# Patient Record
Sex: MALE | Race: WHITE | NOT HISPANIC OR LATINO | Employment: FULL TIME | URBAN - METROPOLITAN AREA
[De-identification: names, ages, dates, MRNs, and addresses within clinical notes are randomized per-mention and may not be internally consistent; named-entity substitution may affect disease eponyms.]

---

## 2023-09-16 VITALS
HEART RATE: 70 BPM | BODY MASS INDEX: 29.43 KG/M2 | WEIGHT: 196.4 LBS | SYSTOLIC BLOOD PRESSURE: 136 MMHG | DIASTOLIC BLOOD PRESSURE: 72 MMHG

## 2023-09-28 ENCOUNTER — HOSPITAL ENCOUNTER (OUTPATIENT)
Dept: DATA CONVERSION | Facility: HOSPITAL | Age: 63
Discharge: HOME | End: 2023-09-28
Payer: COMMERCIAL

## 2023-10-04 DIAGNOSIS — M54.50 ACUTE RIGHT-SIDED LOW BACK PAIN WITHOUT SCIATICA: Primary | ICD-10-CM

## 2023-10-04 RX ORDER — CYCLOBENZAPRINE HCL 10 MG
10 TABLET ORAL 3 TIMES DAILY PRN
COMMUNITY
Start: 2023-08-29 | End: 2023-10-04 | Stop reason: SDUPTHER

## 2023-10-04 RX ORDER — CYCLOBENZAPRINE HCL 10 MG
10 TABLET ORAL 3 TIMES DAILY PRN
Qty: 30 TABLET | Refills: 0 | Status: SHIPPED | OUTPATIENT
Start: 2023-10-04 | End: 2023-10-05 | Stop reason: SDUPTHER

## 2023-10-04 NOTE — TELEPHONE ENCOUNTER
Pt called requesting refill Flexeril.  He advised he can't sleep at night and in the past the muscle relaxer has helped

## 2023-10-06 RX ORDER — CYCLOBENZAPRINE HCL 10 MG
10 TABLET ORAL 3 TIMES DAILY PRN
Qty: 30 TABLET | Refills: 0 | Status: SHIPPED | OUTPATIENT
Start: 2023-10-06 | End: 2023-12-18 | Stop reason: WASHOUT

## 2023-10-07 PROBLEM — Z98.890 OTHER SPECIFIED POSTPROCEDURAL STATES: Status: ACTIVE | Noted: 2023-10-07

## 2023-10-07 PROBLEM — K42.9 REDUCIBLE UMBILICAL HERNIA: Status: ACTIVE | Noted: 2023-10-07

## 2023-10-07 PROBLEM — I10 HIGH BLOOD PRESSURE: Status: ACTIVE | Noted: 2023-10-07

## 2023-10-07 PROBLEM — M54.30 SCIATICA: Status: ACTIVE | Noted: 2023-10-07

## 2023-10-07 PROBLEM — L71.9 ROSACEA: Status: ACTIVE | Noted: 2023-10-07

## 2023-10-07 PROBLEM — H93.19 RINGING IN EARS: Status: ACTIVE | Noted: 2023-10-07

## 2023-10-07 RX ORDER — AMLODIPINE BESYLATE 5 MG/1
5 TABLET ORAL DAILY
COMMUNITY
End: 2023-11-17

## 2023-10-07 RX ORDER — ZINC SULFATE 50(220)MG
1 CAPSULE ORAL DAILY
COMMUNITY
Start: 2021-09-07

## 2023-10-07 RX ORDER — LISINOPRIL AND HYDROCHLOROTHIAZIDE 20; 25 MG/1; MG/1
1 TABLET ORAL DAILY
COMMUNITY
End: 2023-11-17

## 2023-10-10 ENCOUNTER — TREATMENT (OUTPATIENT)
Dept: PHYSICAL THERAPY | Facility: CLINIC | Age: 63
End: 2023-10-10
Payer: COMMERCIAL

## 2023-10-10 DIAGNOSIS — M54.9 DORSALGIA: ICD-10-CM

## 2023-10-10 DIAGNOSIS — M54.9 DORSALGIA: Primary | ICD-10-CM

## 2023-10-10 PROCEDURE — 97140 MANUAL THERAPY 1/> REGIONS: CPT | Mod: GP,CQ

## 2023-10-10 PROCEDURE — 97110 THERAPEUTIC EXERCISES: CPT | Mod: GP,CQ

## 2023-10-10 PROCEDURE — 97014 ELECTRIC STIMULATION THERAPY: CPT | Mod: GP,CQ

## 2023-10-10 ASSESSMENT — PAIN - FUNCTIONAL ASSESSMENT: PAIN_FUNCTIONAL_ASSESSMENT: 0-10

## 2023-10-10 NOTE — PROGRESS NOTES
Physical Therapy    Physical Therapy Treatment    Patient Name: Alexandre Poole  MRN: [id number]  Today's Date: 10/10/2023  Time Calculation  Start Time: 0900  Stop Time: 0945  Time Calculation (min): 45 min      Assessment:  PT Assessment  PT Assessment Results:  (Pt able to lie supine for thera ex without difficulty, however was proped with pillowsw, able to jerrell supine IFC with H.P. as well)  Assessment Comment: cont with strengthening act as jerrell    Plan:       Current Problem  1. Dorsalgia  Follow Up In Physical Therapy          Subjective   General  Reason for Referral: LBP/SPASMS  General Comment: Pt reports discomfort has decreased in intensity ,seems to have settled in B hip areas  Precautions  Precautions  Precautions Comment: none       Pain  Pain Assessment: 0-10  Pain Score:  (varries with act still unable to sleep in bed at this time)  Pain Type: Chronic pain  Pain Location: Back  Pain Radiating Towards: B hips    Objective                Treatments:  Therapeutic Exercise  Therapeutic Exercise Performed: Yes  Therapeutic Exercise Activity 1: Sitting: HS stretch  Therapeutic Exercise Activity 2: Standing: add-abd,  hip ext ,knee flex, slant board, mini squats, toe raises, knee flex# 20 wt 1 x 10/ kne ext # 10 wt 1 x 10  Therapeutic Exercise Activity 3: Supine : add-abd, ball squeeze add, GTB abd, heel slides, SLR, (issued GTB)    Manual Therapy  Manual Therapy Performed: Yes  Manual Therapy Activity 1: STM sitting B LS area    Modalities  Modalities Used: Yes  Modality 1:  (IFC with H.P. supine)        OP EDUCATION:Cont with HEP stretching       Goals:  reduce pain to <3/10 for improved tolerance of motion and functional activities  +  Increase lumbar ROM by 10 degrees in all planes of motion as needed for ADL's   +  The pt will have 5/5 strength in  LE as needed for  + educate on core strength in order to support the lumbar spine in function  +  the pt will be independent in self posture correction to  place reduced stress on the lumbar spine  + Reduce soft tissue restriction for reduced pain  +   The pt will walk with  in the community with good balance and stability  +  the pt will be able independent in a HEP for self managment  +   Able to return to prior level of activities

## 2023-10-12 ENCOUNTER — TREATMENT (OUTPATIENT)
Dept: PHYSICAL THERAPY | Facility: CLINIC | Age: 63
End: 2023-10-12
Payer: COMMERCIAL

## 2023-10-12 DIAGNOSIS — M62.830 BACK SPASM: Primary | ICD-10-CM

## 2023-10-12 DIAGNOSIS — M54.9 DORSALGIA: ICD-10-CM

## 2023-10-12 DIAGNOSIS — M54.30 SCIATICA, UNSPECIFIED LATERALITY: ICD-10-CM

## 2023-10-12 PROCEDURE — 97014 ELECTRIC STIMULATION THERAPY: CPT | Mod: GP | Performed by: PHYSICAL THERAPIST

## 2023-10-12 PROCEDURE — 97140 MANUAL THERAPY 1/> REGIONS: CPT | Mod: GP | Performed by: PHYSICAL THERAPIST

## 2023-10-12 PROCEDURE — 97110 THERAPEUTIC EXERCISES: CPT | Mod: GP | Performed by: PHYSICAL THERAPIST

## 2023-10-12 ASSESSMENT — PAIN - FUNCTIONAL ASSESSMENT: PAIN_FUNCTIONAL_ASSESSMENT: 0-10

## 2023-10-12 ASSESSMENT — PAIN SCALES - GENERAL: PAINLEVEL_OUTOF10: 1

## 2023-10-12 NOTE — PROGRESS NOTES
Physical Therapy Treatment    Patient Name: Alexandre Poole  MRN: [id number]  Today's Date: 10/12/2023  Time Calculation  Start Time: 0845  Stop Time: 0945  Time Calculation (min): 60 min    Visit Number:  9    Current Problem  1. Back spasm        2. Dorsalgia  Follow Up In Physical Therapy      3. Sciatica, unspecified laterality            Precautions  Precautions  Precautions Comment: none    Pain  Pain Assessment: 0-10  Pain Score: 1    Subjective/General  Reason for Referral: LBP/SPASMS  General Comment: Pain starts at about 6-7 pm.  He plans to go back to the doctor to get an x-ray to know what is going on in his back (4-5/10)      Objective    Treatments:  Recumbent biking : 7 minutes at level 1    Slant board 3 x30 seconds  Hamstring stretch 3 x30 seconds  Seated piriformis stretch  1x30 seconds  Seat buttock stretch (knee hug)    3x30 seconds    Supine:  LTR, core stabilization x5 with breathing,  attempted hip flexor stretch    Manual Therapy  Manual Therapy Performed: Yes  Manual Therapy Activity 1: STM sitting B LS area     Modalities  Modalities Used: Yes  Modality 1:  Interferential electric stimulation (IFC) performed to lumbar R>L   at 0-10 Hz with sweep for 15 minutes to strong sensation with moist heat in L side lying        OP EDUCATION:  Cont with HEP stretching     Goals:    +reduce pain to <3/10 for improved tolerance of motion and functional activities  +  Increase lumbar ROM by 10 degrees in all planes of motion as needed for ADL's   +  The pt will have 5/5 strength in  LE as needed for  + educate on core strength in order to support the lumbar spine in function  +  the pt will be independent in self posture correction to place reduced stress on the lumbar spine  + Reduce soft tissue restriction for reduced pain  +   The pt will walk with  in the community with good balance and stability  +  the pt will be able independent in a HEP for self managment  +   Able to return to prior level of  activities     Assessment:  PT Assessment  Assessment Comment: The R LE ER on the bike to compensate for lack of flexibility in the R hip    End of session pain ratin/10 after sitting up after stim, but always calms down quickly    Plan:     Reassess next visit    Assessment of current progress against goals:  Progressing toward functional goals

## 2023-10-16 ENCOUNTER — TREATMENT (OUTPATIENT)
Dept: PHYSICAL THERAPY | Facility: CLINIC | Age: 63
End: 2023-10-16
Payer: COMMERCIAL

## 2023-10-16 DIAGNOSIS — M54.30 SCIATICA, UNSPECIFIED LATERALITY: ICD-10-CM

## 2023-10-16 DIAGNOSIS — M54.9 DORSALGIA: ICD-10-CM

## 2023-10-16 DIAGNOSIS — M62.830 BACK SPASM: Primary | ICD-10-CM

## 2023-10-16 PROCEDURE — 97140 MANUAL THERAPY 1/> REGIONS: CPT | Mod: GP | Performed by: PHYSICAL THERAPIST

## 2023-10-16 PROCEDURE — 97110 THERAPEUTIC EXERCISES: CPT | Mod: GP | Performed by: PHYSICAL THERAPIST

## 2023-10-16 ASSESSMENT — PAIN - FUNCTIONAL ASSESSMENT: PAIN_FUNCTIONAL_ASSESSMENT: 0-10

## 2023-10-16 NOTE — PROGRESS NOTES
Physical Therapy Re-assessment and Treatment    Patient Name: Alexandre Poole  MRN: [id number]  Today's Date: 10/16/2023  Time Calculation  Start Time: 0745  Stop Time: 0835  Time Calculation (min): 50 min    Visit Number:  10   Current Problem  1. Back spasm        2. Dorsalgia  Follow Up In Physical Therapy      3. Sciatica, unspecified laterality            Precautions  Precautions  Precautions Comment: none    Pain  Pain Assessment: 0-10  Pain Score:  (3-4/10)  Pain Location: Hip  Pain Orientation: Right    Pain ranges 0/10 -4/10    Subjective/General  Reason for Referral: LBP/SPASMS  General Comment: the pt had a god weekend and had no pain on Friday and Saturday but yesterday and today pain has gone into his hip.  He is able to take the trash out and go up and down steps without pain    He  is sleeping in the zero gravity chair    [facility] 28% impaired (from 54% )    Objective    LUMBAR ROM:   Flexion 48  Extension 26 with pulling  Side bend   R:      25     L  31   Rotation      R  75%       L   100%        MMT:                          R                      L     Hip flexion                  5/5               5 /5    SPECIAL TESTS    R                   L  JASON                 ( - )                 ( - )  FADIR                  ( - )                 ( - )  Cr              ( + )                 ( + )  SLR                       ( - )                 ( - )    FLEXIBILTY:                        R                L  Piriformis                          tight               tight  SKTC                                    ( -)                ( -)  Hamstrings ( at 90/90)      -25                -20     Treatment:  Therapeutic Exercise  Therapeutic Exercise Performed: Yes  Recumbent biking : 8 minutes at level 2    Slant board 3 x30 seconds  Hamstring stretch 3 x30 seconds  Standing hip flexor stretch 3 x30 seconds    Manual Therapy  Manual Therapy Performed: Yes  STM to r lumbar and mid to low thoracic  TPR to R  lumbosacral area    OP EDUCATION:   Discussed current status, goals, and treatment plan.  educated on self trigger point release techniques.    Assessment:   The Pt is making good progress with reduced impairment per Oswestry and improvements in ROM, strength, pain, and flexibility.  He has remaining deficits, but pain is less overall with days of no pain.  He has not returned to prior level of function yet with not sleeping in the bed yet, but can tolerated short period laying supine    End of session pain ratin/10    Plan:     Continue with current POC with the following changes focus on stretches with focus on back and hips    Goals:  Active       PT Problem       +  reduce pain to <3/10 for improved tolerance of motion and functional activities  (Progressing)       Start:  23    Expected End:  23            +  Increase lumbar ROM by 10 degrees in all planes of motion as needed for ADL's   (Progressing)       Start:  23    Expected End:  23            +  The pt will have 5/5 strength in  LE as needed for  (Met)       Start:  23    Expected End:  23    Resolved:  10/16/23         + educate on core strength in order to support the lumbar spine in function  (Progressing)       Start:  23    Expected End:  23            +  the pt will be independent in self posture correction to place reduced stress on the lumbar spine  (Progressing)       Start:  23    Expected End:  23            + Reduce soft tissue restriction for reduced pain  (Progressing)       Start:  23    Expected End:  23            +   The pt will walk with  in the community with good balance and stability  (Progressing)       Start:  23    Expected End:  23            +  the pt will be able independent in a HEP for self managment  (Progressing)       Start:  23    Expected End:  23            +   Able to return to prior level of activities (Progressing)        Start:  09/22/23    Expected End:  12/21/23

## 2023-10-19 ENCOUNTER — TREATMENT (OUTPATIENT)
Dept: PHYSICAL THERAPY | Facility: CLINIC | Age: 63
End: 2023-10-19
Payer: COMMERCIAL

## 2023-10-19 DIAGNOSIS — M54.9 DORSALGIA: ICD-10-CM

## 2023-10-19 DIAGNOSIS — M62.830 BACK SPASM: Primary | ICD-10-CM

## 2023-10-19 PROCEDURE — 97110 THERAPEUTIC EXERCISES: CPT | Mod: GP | Performed by: PHYSICAL THERAPIST

## 2023-10-19 PROCEDURE — 97140 MANUAL THERAPY 1/> REGIONS: CPT | Mod: GP | Performed by: PHYSICAL THERAPIST

## 2023-10-19 NOTE — PROGRESS NOTES
Physical Therapy Re-assessment and Treatment    Patient Name: Alexandre Poole  MRN: [id number]  Today's Date: 10/19/2023  Time Calculation  Start Time: 743  Stop Time: 829  Time Calculation (min): 46 min    Visit Number:  11  Current Problem  1. Back spasm        2. Dorsalgia  Follow Up In Physical Therapy          Precautions  none    Pain  0/10    Subjective/General  No problem this morning.  He slept without any pain.  He occasionally needs ibuprofen.      Objective  Treatment:  Recumbent biking : 8 minutes at level 1    Slant board 3 x30 seconds  Hamstring stretch 3 x30 seconds  Supine R hip flexor stretch 3 x30 seconds  Seated buttock stretch 3 x 30 seconds each    Supine:  LTR, core stabilization x5 with breathing,       Manual Therapy  Manual Therapy Performed: Yes  Manual Therapy Activity 1: STM sitting B LS area  STM to r lumbar and mid to low thoracic  TPR to R lumbosacral area     OP EDUCATION:   Discussed use of home massage unit     Assessment:   The Pt is making good progress with ability to perform hip flexor stretch and core stabilization in supine with pressure on the low back, but not increased pain.  Tightness remains to the r of the lumbar spine with hard trigger points.      End of session pain ratin-2/10 - Pt says this usually calms down as he walks to the car    Plan:   Progress as tolerated with increases streching, manual and core.  Consider US to trigger points    Goals:  Active       PT Problem       +  reduce pain to <3/10 for improved tolerance of motion and functional activities  (Progressing)       Start:  23    Expected End:  23            +  Increase lumbar ROM by 10 degrees in all planes of motion as needed for ADL's   (Progressing)       Start:  23    Expected End:  23            +  The pt will have 5/5 strength in  LE as needed for  (Met)       Start:  23    Expected End:  23    Resolved:  10/16/23         + educate on core strength in  order to support the lumbar spine in function  (Progressing)       Start:  09/22/23    Expected End:  12/21/23            +  the pt will be independent in self posture correction to place reduced stress on the lumbar spine  (Progressing)       Start:  09/22/23    Expected End:  12/21/23            + Reduce soft tissue restriction for reduced pain  (Progressing)       Start:  09/22/23    Expected End:  12/21/23            +   The pt will walk with  in the community with good balance and stability  (Progressing)       Start:  09/22/23    Expected End:  12/21/23            +  the pt will be able independent in a HEP for self managment  (Progressing)       Start:  09/22/23    Expected End:  12/21/23            +   Able to return to prior level of activities (Progressing)       Start:  09/22/23    Expected End:  12/21/23

## 2023-10-24 ENCOUNTER — TREATMENT (OUTPATIENT)
Dept: PHYSICAL THERAPY | Facility: CLINIC | Age: 63
End: 2023-10-24
Payer: COMMERCIAL

## 2023-10-24 PROCEDURE — 97110 THERAPEUTIC EXERCISES: CPT | Mod: GP,CQ

## 2023-10-24 PROCEDURE — 97124 MASSAGE THERAPY: CPT | Mod: GP,CQ

## 2023-10-24 ASSESSMENT — PAIN - FUNCTIONAL ASSESSMENT: PAIN_FUNCTIONAL_ASSESSMENT: 0-10

## 2023-10-24 ASSESSMENT — PAIN SCALES - GENERAL: PAINLEVEL_OUTOF10: 3

## 2023-10-24 NOTE — PROGRESS NOTES
Physical Therapy    Physical Therapy Treatment    Patient Name: Alexandre Poole  MRN: [id number]  Today's Date: 10/24/2023         Assessment:  PT Assessment  Assessment Comment: Cont with stretching and strengthening as jerrell    Plan:   Progress as tolerated    Current Problem  No diagnosis found.    Subjective   General  Reason for Referral: LBP/SPASMS  General Comment: pain has gone into his hip (Discomfort decreasing mostly feels fine)  Precautions none  Precautions  Precautions Comment: none         Pain Assessment: 0-10  Pain Score: 3  Pain Location: Hip  Pain Orientation: Right    Objective     Treatments:  Therapeutic Exercise  Therapeutic Exercise Performed: Yes  Therapeutic Exercise Activity 1: Bike x 8 min, slant board stretch, H.S. stretch, Supine: SKTC/H.S. stretch and piriformis stretch, PPT, LTR  Therapeutic Exercise Activity 2: Supine ball squeeze add, OTB abd 10 x 1 each, Prone quad stretch with green strap (Supine hip flexor stretch over EOB)  Therapeutic Exercise Activity 3: Standing add-abd, hip ext, knee flex toe raises, mini squats 10 x 1 each    Manual Therapy  Manual Therapy Performed: Yes  Manual Therapy Activity 1: STM in L sidelying to R LS area    OP EDUCATION:Discussed obtaining tennis ball ,sitting on ball as needed piriformis area, cont with HEP as issued previously       Goals:  Active       PT Problem       +  reduce pain to <3/10 for improved tolerance of motion and functional activities  (Progressing)       Start:  09/22/23    Expected End:  12/21/23            +  Increase lumbar ROM by 10 degrees in all planes of motion as needed for ADL's   (Progressing)       Start:  09/22/23    Expected End:  12/21/23            +  The pt will have 5/5 strength in  LE as needed for  (Met)       Start:  09/22/23    Expected End:  12/21/23    Resolved:  10/16/23         + educate on core strength in order to support the lumbar spine in function  (Progressing)       Start:  09/22/23    Expected End:   12/21/23            +  the pt will be independent in self posture correction to place reduced stress on the lumbar spine  (Progressing)       Start:  09/22/23    Expected End:  12/21/23            + Reduce soft tissue restriction for reduced pain  (Progressing)       Start:  09/22/23    Expected End:  12/21/23            +   The pt will walk with  in the community with good balance and stability  (Progressing)       Start:  09/22/23    Expected End:  12/21/23            +  the pt will be able independent in a HEP for self managment  (Progressing)       Start:  09/22/23    Expected End:  12/21/23            +   Able to return to prior level of activities (Progressing)       Start:  09/22/23    Expected End:  12/21/23

## 2023-10-26 ENCOUNTER — TREATMENT (OUTPATIENT)
Dept: PHYSICAL THERAPY | Facility: CLINIC | Age: 63
End: 2023-10-26
Payer: COMMERCIAL

## 2023-10-26 DIAGNOSIS — M62.830 BACK SPASM: Primary | ICD-10-CM

## 2023-10-26 PROCEDURE — 97140 MANUAL THERAPY 1/> REGIONS: CPT | Mod: GP | Performed by: PHYSICAL THERAPIST

## 2023-10-26 PROCEDURE — 97110 THERAPEUTIC EXERCISES: CPT | Mod: GP | Performed by: PHYSICAL THERAPIST

## 2023-10-26 ASSESSMENT — PAIN SCALES - GENERAL: PAINLEVEL_OUTOF10: 1

## 2023-10-26 ASSESSMENT — PAIN - FUNCTIONAL ASSESSMENT: PAIN_FUNCTIONAL_ASSESSMENT: 0-10

## 2023-10-30 ENCOUNTER — TREATMENT (OUTPATIENT)
Dept: PHYSICAL THERAPY | Facility: CLINIC | Age: 63
End: 2023-10-30
Payer: COMMERCIAL

## 2023-10-30 PROCEDURE — 97140 MANUAL THERAPY 1/> REGIONS: CPT | Mod: GP,CQ

## 2023-10-30 PROCEDURE — 97110 THERAPEUTIC EXERCISES: CPT | Mod: GP,CQ

## 2023-10-30 ASSESSMENT — PAIN SCALES - GENERAL: PAINLEVEL_OUTOF10: 1

## 2023-10-30 ASSESSMENT — PAIN - FUNCTIONAL ASSESSMENT: PAIN_FUNCTIONAL_ASSESSMENT: 0-10

## 2023-10-30 NOTE — PROGRESS NOTES
Physical Therapy    Physical Therapy Treatment    Patient Name: Alexandre Poole  MRN: [id number]  Today's Date: 10/30/2023         Assessment:    Performed all act well without discomfort and added weights this session with standing act    Plan:   Progress as tolerated    Current Problem  No diagnosis found.    Subjective   General  Reason for Referral: LBP/SPASMS  General Comment: still uncomfortable at night  after about 4 hours, maybe bed, discomfortis slowly disappearing  Precautions  Precautions  Precautions Comment: none  Vital Signs     Pain  Pain Assessment: 0-10  Pain Score: 1  Pain Location: Back  Pain Orientation: Right    Objective     Therapeutic Exercise  Therapeutic Exercise Performed: Yes  Therapeutic Exercise Activity 1: Bike , sitting HS stretch, slant board stretch (Supine: PPT , LTR,SKTC/HS stretch)  Therapeutic Exercise Activity 2: Prone green strap quad stretch, prone toe prop knee ext  Therapeutic Exercise Activity 3: Standing: add-abd , hip ext, mini squats, toe raises, knee flex added # 2 wt 10 x 1 each  Added supine: SKTC/HS stretch/piriformis stretch      Goals:  Active       PT Problem       +  reduce pain to <3/10 for improved tolerance of motion and functional activities  (Progressing)       Start:  09/22/23    Expected End:  12/21/23            +  Increase lumbar ROM by 10 degrees in all planes of motion as needed for ADL's   (Progressing)       Start:  09/22/23    Expected End:  12/21/23            +  The pt will have 5/5 strength in  LE as needed for  (Met)       Start:  09/22/23    Expected End:  12/21/23    Resolved:  10/16/23         + educate on core strength in order to support the lumbar spine in function  (Progressing)       Start:  09/22/23    Expected End:  12/21/23            +  the pt will be independent in self posture correction to place reduced stress on the lumbar spine  (Progressing)       Start:  09/22/23    Expected End:  12/21/23            + Reduce soft tissue  restriction for reduced pain  (Progressing)       Start:  09/22/23    Expected End:  12/21/23            +   The pt will walk with  in the community with good balance and stability  (Progressing)       Start:  09/22/23    Expected End:  12/21/23            +  the pt will be able independent in a HEP for self managment  (Progressing)       Start:  09/22/23    Expected End:  12/21/23            +   Able to return to prior level of activities (Progressing)       Start:  09/22/23    Expected End:  12/21/23

## 2023-11-02 ENCOUNTER — TREATMENT (OUTPATIENT)
Dept: PHYSICAL THERAPY | Facility: CLINIC | Age: 63
End: 2023-11-02
Payer: COMMERCIAL

## 2023-11-02 DIAGNOSIS — M62.830 BACK SPASM: Primary | ICD-10-CM

## 2023-11-02 PROCEDURE — 97140 MANUAL THERAPY 1/> REGIONS: CPT | Mod: GP | Performed by: PHYSICAL THERAPIST

## 2023-11-02 PROCEDURE — 97110 THERAPEUTIC EXERCISES: CPT | Mod: GP | Performed by: PHYSICAL THERAPIST

## 2023-11-02 ASSESSMENT — PAIN - FUNCTIONAL ASSESSMENT: PAIN_FUNCTIONAL_ASSESSMENT: 0-10

## 2023-11-02 ASSESSMENT — PAIN SCALES - GENERAL: PAINLEVEL_OUTOF10: 1

## 2023-11-02 NOTE — PROGRESS NOTES
Physical Therapy Treatment     Patient Name: Alexandre Poole  MRN: [id number]  Today's Date: 2023  Time Calculation  Start Time: 0750  Stop Time: 0845  Time Calculation (min): 55 min    Visit Number:  15     Current Problem  No diagnosis found.    Precautions  Precautions  Precautions Comment: none    Pain  Pain Assessment: 0-10  Pain Score: 1  Pain Location: Back  Pain Orientation: Right    Subjective/General  Reason for Referral: LBP/SPASMS  The pt is uncomfortable to sleep in the recliner, but can only sleep in the bed for about 4 hours before pain.    Objective  Treatment:    Therapeutic Exercise:  40 minutes   Bike  10 minutes    sitting   +HS stretch 3x30 seconds  +slant board stretch 3x30 seconds  +Piriformis stretch 3x30 seconds  (DNP -Supine: PPT , LTR,SKTC/HS stretch)    Prone green strap quad stretch, prone toe prop knee ext    Sidelying:  Passive hip flexor stretch R    Manual Therapy:  15 minutes  STM and TPR to R lumbosacral region.     OP EDUCATION:     Continue stretching to maintain motion    Assessment:   Improved motion with stretched equal L to R, but trigger points remain and are hard in areas.      End of session pain ratin/10    Plan:  Progress as tolerated    Assessment of current progress against goals:  Symptomatic relief with treatment  Goals:  Active       PT Problem       +  reduce pain to <3/10 for improved tolerance of motion and functional activities  (Progressing)       Start:  23    Expected End:  23            +  Increase lumbar ROM by 10 degrees in all planes of motion as needed for ADL's   (Progressing)       Start:  23    Expected End:  23            +  The pt will have 5/5 strength in  LE as needed for  (Met)       Start:  23    Expected End:  23    Resolved:  10/16/23         + educate on core strength in order to support the lumbar spine in function  (Progressing)       Start:  23    Expected End:  23            +  the  pt will be independent in self posture correction to place reduced stress on the lumbar spine  (Progressing)       Start:  09/22/23    Expected End:  12/21/23            + Reduce soft tissue restriction for reduced pain  (Progressing)       Start:  09/22/23    Expected End:  12/21/23            +   The pt will walk with  in the community with good balance and stability  (Progressing)       Start:  09/22/23    Expected End:  12/21/23            +  the pt will be able independent in a HEP for self managment  (Progressing)       Start:  09/22/23    Expected End:  12/21/23            +   Able to return to prior level of activities (Progressing)       Start:  09/22/23    Expected End:  12/21/23

## 2023-11-06 ENCOUNTER — TREATMENT (OUTPATIENT)
Dept: PHYSICAL THERAPY | Facility: CLINIC | Age: 63
End: 2023-11-06
Payer: COMMERCIAL

## 2023-11-06 PROCEDURE — 97110 THERAPEUTIC EXERCISES: CPT | Mod: GP,CQ

## 2023-11-06 PROCEDURE — 97140 MANUAL THERAPY 1/> REGIONS: CPT | Mod: GP,CQ

## 2023-11-06 ASSESSMENT — PAIN SCALES - GENERAL: PAINLEVEL_OUTOF10: 0 - NO PAIN

## 2023-11-06 ASSESSMENT — PAIN - FUNCTIONAL ASSESSMENT: PAIN_FUNCTIONAL_ASSESSMENT: 0-10

## 2023-11-06 NOTE — PROGRESS NOTES
Physical Therapy    Physical Therapy Treatment    Patient Name: Alexandre Poole  MRN: [id number]  Today's Date: 11/6/2023         Assessment:Progressing well towards estab. goals  PT Assessment  PT Assessment Results:  (Progressing well towards estab. goals)    Plan:Cont with HEP       Current Problem  No diagnosis found.    Subjective Now able to sleep at night in bed without waking up  General  Reason for Referral: LBP/SPASMS  General Comment: now able to sleep comfortable in bed all night without waking up  Pain  Pain Assessment: 0-10  Pain Score: 0 - No pain  Pain Location: Back  Pain Orientation: Right    Objective          Treatments:  Therapeutic Exercise  Therapeutic Exercise Performed: Yes  Therapeutic Exercise Activity 1: Supine SKTC-HS - piriformis stretch, LTR in hooklying, ball squeeze add, GTB abd, bridging  Therapeutic Exercise Activity 2: Prone green strap knee flex, toe prop knee ext  Therapeutic Exercise Activity 3: Standing: add-abd, hip ext knee flex with # 2 wt added, mini squats toe raises 10 x 1 each    Manual Therapy  Manual Therapy Performed: Yes  Manual Therapy Activity 1: STM  to RLS area        OP EDUCATION:cont withy HEP       Goals:  Active       PT Problem       +  reduce pain to <3/10 for improved tolerance of motion and functional activities  (Progressing)       Start:  09/22/23    Expected End:  12/21/23            +  Increase lumbar ROM by 10 degrees in all planes of motion as needed for ADL's   (Progressing)       Start:  09/22/23    Expected End:  12/21/23            +  The pt will have 5/5 strength in  LE as needed for  (Met)       Start:  09/22/23    Expected End:  12/21/23    Resolved:  10/16/23         + educate on core strength in order to support the lumbar spine in function  (Progressing)       Start:  09/22/23    Expected End:  12/21/23            +  the pt will be independent in self posture correction to place reduced stress on the lumbar spine  (Progressing)        Start:  09/22/23    Expected End:  12/21/23            + Reduce soft tissue restriction for reduced pain  (Progressing)       Start:  09/22/23    Expected End:  12/21/23            +   The pt will walk with  in the community with good balance and stability  (Progressing)       Start:  09/22/23    Expected End:  12/21/23            +  the pt will be able independent in a HEP for self managment  (Progressing)       Start:  09/22/23    Expected End:  12/21/23            +   Able to return to prior level of activities (Progressing)       Start:  09/22/23    Expected End:  12/21/23

## 2023-11-07 DIAGNOSIS — M54.30 SCIATICA, UNSPECIFIED LATERALITY: ICD-10-CM

## 2023-11-07 DIAGNOSIS — M62.830 BACK SPASM: Primary | ICD-10-CM

## 2023-11-09 ENCOUNTER — APPOINTMENT (OUTPATIENT)
Dept: PHYSICAL THERAPY | Facility: CLINIC | Age: 63
End: 2023-11-09
Payer: COMMERCIAL

## 2023-11-17 DIAGNOSIS — I10 ESSENTIAL (PRIMARY) HYPERTENSION: ICD-10-CM

## 2023-11-17 RX ORDER — LISINOPRIL AND HYDROCHLOROTHIAZIDE 20; 25 MG/1; MG/1
1 TABLET ORAL DAILY
Qty: 90 TABLET | Refills: 1 | Status: SHIPPED | OUTPATIENT
Start: 2023-11-17 | End: 2024-05-15

## 2023-11-17 RX ORDER — AMLODIPINE BESYLATE 5 MG/1
5 TABLET ORAL DAILY
Qty: 90 TABLET | Refills: 1 | Status: SHIPPED | OUTPATIENT
Start: 2023-11-17 | End: 2024-05-15

## 2023-11-22 ENCOUNTER — APPOINTMENT (OUTPATIENT)
Dept: PRIMARY CARE | Facility: CLINIC | Age: 63
End: 2023-11-22
Payer: COMMERCIAL

## 2023-12-13 ENCOUNTER — TELEPHONE (OUTPATIENT)
Dept: PRIMARY CARE | Facility: CLINIC | Age: 63
End: 2023-12-13
Payer: COMMERCIAL

## 2023-12-13 DIAGNOSIS — Z12.5 PROSTATE CANCER SCREENING: ICD-10-CM

## 2023-12-13 DIAGNOSIS — I10 PRIMARY HYPERTENSION: Primary | ICD-10-CM

## 2023-12-18 ENCOUNTER — OFFICE VISIT (OUTPATIENT)
Dept: PRIMARY CARE | Facility: CLINIC | Age: 63
End: 2023-12-18
Payer: COMMERCIAL

## 2023-12-18 VITALS
SYSTOLIC BLOOD PRESSURE: 132 MMHG | WEIGHT: 201 LBS | DIASTOLIC BLOOD PRESSURE: 78 MMHG | HEIGHT: 68 IN | HEART RATE: 74 BPM | OXYGEN SATURATION: 98 % | BODY MASS INDEX: 30.46 KG/M2

## 2023-12-18 DIAGNOSIS — I10 PRIMARY HYPERTENSION: Primary | ICD-10-CM

## 2023-12-18 DIAGNOSIS — Z12.5 PROSTATE CANCER SCREENING: ICD-10-CM

## 2023-12-18 DIAGNOSIS — R11.0 NAUSEA: ICD-10-CM

## 2023-12-18 PROBLEM — Z98.890 OTHER SPECIFIED POSTPROCEDURAL STATES: Status: RESOLVED | Noted: 2023-10-07 | Resolved: 2023-12-18

## 2023-12-18 PROCEDURE — 3078F DIAST BP <80 MM HG: CPT | Performed by: FAMILY MEDICINE

## 2023-12-18 PROCEDURE — 3075F SYST BP GE 130 - 139MM HG: CPT | Performed by: FAMILY MEDICINE

## 2023-12-18 PROCEDURE — 99214 OFFICE O/P EST MOD 30 MIN: CPT | Performed by: FAMILY MEDICINE

## 2023-12-18 PROCEDURE — 1036F TOBACCO NON-USER: CPT | Performed by: FAMILY MEDICINE

## 2023-12-18 ASSESSMENT — PATIENT HEALTH QUESTIONNAIRE - PHQ9
SUM OF ALL RESPONSES TO PHQ9 QUESTIONS 1 AND 2: 0
2. FEELING DOWN, DEPRESSED OR HOPELESS: NOT AT ALL
1. LITTLE INTEREST OR PLEASURE IN DOING THINGS: NOT AT ALL

## 2023-12-18 ASSESSMENT — PAIN SCALES - GENERAL: PAINLEVEL: 1

## 2023-12-18 ASSESSMENT — ENCOUNTER SYMPTOMS
LOSS OF SENSATION IN FEET: 0
OCCASIONAL FEELINGS OF UNSTEADINESS: 0
DEPRESSION: 0

## 2023-12-18 ASSESSMENT — LIFESTYLE VARIABLES
HOW OFTEN DO YOU HAVE A DRINK CONTAINING ALCOHOL: MONTHLY OR LESS
SKIP TO QUESTIONS 9-10: 0
AUDIT-C TOTAL SCORE: 2
HOW MANY STANDARD DRINKS CONTAINING ALCOHOL DO YOU HAVE ON A TYPICAL DAY: 1 OR 2
HOW OFTEN DO YOU HAVE SIX OR MORE DRINKS ON ONE OCCASION: LESS THAN MONTHLY

## 2023-12-18 NOTE — PROGRESS NOTES
"Subjective   Patient ID: Alexandre Poole is a 63 y.o. male who presents for Follow-up and Hypertension.    He presents today for his regular follow-up.  Overall, he states he has been feeling pretty well.  No trouble tolerating his medication.  He does have occasional nausea.  States he had upper respiratory infection which is cleared but occasionally he still just gets the nausea.  Not sure why exactly.    Currently is having no chest pain.  No shortness of breath.  He has no constipation or diarrhea.  No heartburn.  No headaches.         Review of Systems    Objective   /78   Pulse 74   Ht 1.727 m (5' 8\")   Wt 91.2 kg (201 lb)   SpO2 98%   BMI 30.56 kg/m²     Physical Exam  Constitutional:       Appearance: Normal appearance.   Neck:      Thyroid: No thyromegaly or thyroid tenderness.      Vascular: No carotid bruit.   Cardiovascular:      Rate and Rhythm: Normal rate and regular rhythm.      Heart sounds: No murmur heard.  Pulmonary:      Effort: Pulmonary effort is normal.      Breath sounds: Normal breath sounds.   Musculoskeletal:      Cervical back: Neck supple.   Neurological:      Mental Status: He is alert.   Psychiatric:         Mood and Affect: Mood normal.         Assessment/Plan   Diagnoses and all orders for this visit:  Primary hypertension  Prostate cancer screening  Nausea  Continue his current medications.  Will check the blood test, since he has not had any since May 2022.  We use the magical chris but he let me know how that does with his nausea.     "

## 2023-12-18 NOTE — PATIENT INSTRUCTIONS
Doing well.   You may use the Magical chris, 2 teaspoons in warm water twice a day. Let me know if not doing better this week.   Check the blood tests fasting.

## 2024-01-10 ENCOUNTER — DOCUMENTATION (OUTPATIENT)
Dept: PHYSICAL THERAPY | Facility: CLINIC | Age: 64
End: 2024-01-10
Payer: COMMERCIAL

## 2024-01-12 ENCOUNTER — DOCUMENTATION (OUTPATIENT)
Dept: PHYSICAL THERAPY | Facility: CLINIC | Age: 64
End: 2024-01-12

## 2024-01-12 ENCOUNTER — LAB (OUTPATIENT)
Dept: LAB | Facility: LAB | Age: 64
End: 2024-01-12
Payer: COMMERCIAL

## 2024-01-12 DIAGNOSIS — Z12.5 PROSTATE CANCER SCREENING: ICD-10-CM

## 2024-01-12 DIAGNOSIS — I10 PRIMARY HYPERTENSION: ICD-10-CM

## 2024-01-12 LAB
ALBUMIN SERPL BCP-MCNC: 4.6 G/DL (ref 3.4–5)
ALP SERPL-CCNC: 48 U/L (ref 33–136)
ALT SERPL W P-5'-P-CCNC: 41 U/L (ref 10–52)
ANION GAP SERPL CALC-SCNC: 10 MMOL/L (ref 10–20)
AST SERPL W P-5'-P-CCNC: 28 U/L (ref 9–39)
BILIRUB SERPL-MCNC: 0.5 MG/DL (ref 0–1.2)
BUN SERPL-MCNC: 20 MG/DL (ref 6–23)
CALCIUM SERPL-MCNC: 9.2 MG/DL (ref 8.6–10.3)
CHLORIDE SERPL-SCNC: 103 MMOL/L (ref 98–107)
CHOLEST SERPL-MCNC: 170 MG/DL (ref 0–199)
CHOLESTEROL/HDL RATIO: 3.4
CO2 SERPL-SCNC: 31 MMOL/L (ref 21–32)
CREAT SERPL-MCNC: 0.8 MG/DL (ref 0.5–1.3)
EGFRCR SERPLBLD CKD-EPI 2021: >90 ML/MIN/1.73M*2
GLUCOSE SERPL-MCNC: 92 MG/DL (ref 74–99)
HDLC SERPL-MCNC: 49.9 MG/DL
LDLC SERPL CALC-MCNC: 94 MG/DL
NON HDL CHOLESTEROL: 120 MG/DL (ref 0–149)
POTASSIUM SERPL-SCNC: 3.9 MMOL/L (ref 3.5–5.3)
PROT SERPL-MCNC: 7.2 G/DL (ref 6.4–8.2)
PSA SERPL-MCNC: 1.58 NG/ML
SODIUM SERPL-SCNC: 140 MMOL/L (ref 136–145)
TRIGL SERPL-MCNC: 131 MG/DL (ref 0–149)
VLDL: 26 MG/DL (ref 0–40)

## 2024-01-12 PROCEDURE — 36415 COLL VENOUS BLD VENIPUNCTURE: CPT

## 2024-01-12 PROCEDURE — 84153 ASSAY OF PSA TOTAL: CPT

## 2024-01-12 NOTE — PROGRESS NOTES
Physical Therapy    Discharge Summary        Discharge Information:   Discahrge date: 1/12/24  Date of last treatment: 11/6/23  Number of sessions 16    Referred for:  Referred by:    Therapy Summary:   No formal re-assessment due to unexpected discharge.  See note on last attended treatment for status  See re-assessment on 10/16/23 for most recent objective measures.    Discharge Status:   Goals were partially met or met.      Rehab Discharge Reason:   The pt was placed on hold for a one month trail of self management with HEP.  He did not return for treatment of communicate any further need for therapy.      Discharge from PT at this time

## 2024-05-15 DIAGNOSIS — I10 ESSENTIAL (PRIMARY) HYPERTENSION: ICD-10-CM

## 2024-05-15 RX ORDER — AMLODIPINE BESYLATE 5 MG/1
5 TABLET ORAL DAILY
Qty: 90 TABLET | Refills: 0 | Status: SHIPPED | OUTPATIENT
Start: 2024-05-15

## 2024-05-15 RX ORDER — LISINOPRIL AND HYDROCHLOROTHIAZIDE 20; 25 MG/1; MG/1
1 TABLET ORAL DAILY
Qty: 90 TABLET | Refills: 0 | Status: SHIPPED | OUTPATIENT
Start: 2024-05-15

## 2024-08-14 DIAGNOSIS — I10 ESSENTIAL (PRIMARY) HYPERTENSION: ICD-10-CM

## 2024-08-19 RX ORDER — AMLODIPINE BESYLATE 5 MG/1
5 TABLET ORAL DAILY
Qty: 90 TABLET | Refills: 0 | Status: SHIPPED | OUTPATIENT
Start: 2024-08-19

## 2024-08-19 RX ORDER — LISINOPRIL AND HYDROCHLOROTHIAZIDE 20; 25 MG/1; MG/1
1 TABLET ORAL DAILY
Qty: 90 TABLET | Refills: 0 | Status: SHIPPED | OUTPATIENT
Start: 2024-08-19

## 2024-11-16 DIAGNOSIS — I10 ESSENTIAL (PRIMARY) HYPERTENSION: ICD-10-CM

## 2024-11-20 DIAGNOSIS — I10 ESSENTIAL (PRIMARY) HYPERTENSION: ICD-10-CM

## 2024-11-20 RX ORDER — LISINOPRIL AND HYDROCHLOROTHIAZIDE 20; 25 MG/1; MG/1
1 TABLET ORAL DAILY
Qty: 90 TABLET | Refills: 0 | Status: SHIPPED | OUTPATIENT
Start: 2024-11-20

## 2024-11-20 RX ORDER — AMLODIPINE BESYLATE 5 MG/1
5 TABLET ORAL DAILY
Qty: 90 TABLET | Refills: 1 | Status: SHIPPED | OUTPATIENT
Start: 2024-11-20

## 2024-11-20 NOTE — TELEPHONE ENCOUNTER
LOV:  8/19/24         NEXT OV:                            LAST FILL:  8/19/24                         LABS:

## 2024-11-24 ENCOUNTER — APPOINTMENT (OUTPATIENT)
Dept: RADIOLOGY | Facility: HOSPITAL | Age: 64
End: 2024-11-24
Payer: COMMERCIAL

## 2024-11-24 ENCOUNTER — APPOINTMENT (OUTPATIENT)
Dept: CARDIOLOGY | Facility: HOSPITAL | Age: 64
End: 2024-11-24
Payer: COMMERCIAL

## 2024-11-24 ENCOUNTER — HOSPITAL ENCOUNTER (EMERGENCY)
Facility: HOSPITAL | Age: 64
Discharge: OTHER INSTITUTION | End: 2024-11-26
Attending: STUDENT IN AN ORGANIZED HEALTH CARE EDUCATION/TRAINING PROGRAM
Payer: COMMERCIAL

## 2024-11-24 DIAGNOSIS — K92.2 GASTROINTESTINAL HEMORRHAGE, UNSPECIFIED GASTROINTESTINAL HEMORRHAGE TYPE: Primary | ICD-10-CM

## 2024-11-24 LAB
ALBUMIN SERPL BCP-MCNC: 4.9 G/DL (ref 3.4–5)
ALP SERPL-CCNC: 53 U/L (ref 33–136)
ALT SERPL W P-5'-P-CCNC: 66 U/L (ref 10–52)
ANION GAP SERPL CALC-SCNC: 14 MMOL/L (ref 10–20)
APPEARANCE UR: CLEAR
AST SERPL W P-5'-P-CCNC: 41 U/L (ref 9–39)
BASOPHILS # BLD AUTO: 0.01 X10*3/UL (ref 0–0.1)
BASOPHILS # BLD AUTO: 0.02 X10*3/UL (ref 0–0.1)
BASOPHILS NFR BLD AUTO: 0.1 %
BASOPHILS NFR BLD AUTO: 0.2 %
BILIRUB SERPL-MCNC: 0.6 MG/DL (ref 0–1.2)
BILIRUB UR STRIP.AUTO-MCNC: NEGATIVE MG/DL
BUN SERPL-MCNC: 18 MG/DL (ref 6–23)
CALCIUM SERPL-MCNC: 9.4 MG/DL (ref 8.6–10.3)
CHLORIDE SERPL-SCNC: 101 MMOL/L (ref 98–107)
CO2 SERPL-SCNC: 29 MMOL/L (ref 21–32)
COLOR UR: YELLOW
CREAT SERPL-MCNC: 0.74 MG/DL (ref 0.5–1.3)
EGFRCR SERPLBLD CKD-EPI 2021: >90 ML/MIN/1.73M*2
EOSINOPHIL # BLD AUTO: 0.02 X10*3/UL (ref 0–0.7)
EOSINOPHIL # BLD AUTO: 0.07 X10*3/UL (ref 0–0.7)
EOSINOPHIL NFR BLD AUTO: 0.2 %
EOSINOPHIL NFR BLD AUTO: 0.6 %
ERYTHROCYTE [DISTWIDTH] IN BLOOD BY AUTOMATED COUNT: 12.7 % (ref 11.5–14.5)
ERYTHROCYTE [DISTWIDTH] IN BLOOD BY AUTOMATED COUNT: 12.9 % (ref 11.5–14.5)
GLUCOSE SERPL-MCNC: 130 MG/DL (ref 74–99)
GLUCOSE UR STRIP.AUTO-MCNC: NORMAL MG/DL
HCT VFR BLD AUTO: 43.2 % (ref 41–52)
HCT VFR BLD AUTO: 49.1 % (ref 41–52)
HGB BLD-MCNC: 15.1 G/DL (ref 13.5–17.5)
HGB BLD-MCNC: 17.7 G/DL (ref 13.5–17.5)
IMM GRANULOCYTES # BLD AUTO: 0.04 X10*3/UL (ref 0–0.7)
IMM GRANULOCYTES # BLD AUTO: 0.05 X10*3/UL (ref 0–0.7)
IMM GRANULOCYTES NFR BLD AUTO: 0.4 % (ref 0–0.9)
IMM GRANULOCYTES NFR BLD AUTO: 0.4 % (ref 0–0.9)
KETONES UR STRIP.AUTO-MCNC: ABNORMAL MG/DL
LACTATE SERPL-SCNC: 1.7 MMOL/L (ref 0.4–2)
LEUKOCYTE ESTERASE UR QL STRIP.AUTO: NEGATIVE
LIPASE SERPL-CCNC: 15 U/L (ref 9–82)
LYMPHOCYTES # BLD AUTO: 0.62 X10*3/UL (ref 1.2–4.8)
LYMPHOCYTES # BLD AUTO: 0.76 X10*3/UL (ref 1.2–4.8)
LYMPHOCYTES NFR BLD AUTO: 6.1 %
LYMPHOCYTES NFR BLD AUTO: 6.2 %
MAGNESIUM SERPL-MCNC: 1.94 MG/DL (ref 1.6–2.4)
MCH RBC QN AUTO: 30.3 PG (ref 26–34)
MCH RBC QN AUTO: 31.1 PG (ref 26–34)
MCHC RBC AUTO-ENTMCNC: 35 G/DL (ref 32–36)
MCHC RBC AUTO-ENTMCNC: 36 G/DL (ref 32–36)
MCV RBC AUTO: 86 FL (ref 80–100)
MCV RBC AUTO: 87 FL (ref 80–100)
MONOCYTES # BLD AUTO: 0.65 X10*3/UL (ref 0.1–1)
MONOCYTES # BLD AUTO: 0.92 X10*3/UL (ref 0.1–1)
MONOCYTES NFR BLD AUTO: 6.5 %
MONOCYTES NFR BLD AUTO: 7.3 %
MUCOUS THREADS #/AREA URNS AUTO: NORMAL /LPF
NEUTROPHILS # BLD AUTO: 10.75 X10*3/UL (ref 1.2–7.7)
NEUTROPHILS # BLD AUTO: 8.65 X10*3/UL (ref 1.2–7.7)
NEUTROPHILS NFR BLD AUTO: 85.5 %
NEUTROPHILS NFR BLD AUTO: 86.5 %
NITRITE UR QL STRIP.AUTO: NEGATIVE
NRBC BLD-RTO: 0 /100 WBCS (ref 0–0)
NRBC BLD-RTO: 0 /100 WBCS (ref 0–0)
PH UR STRIP.AUTO: 7.5 [PH]
PLATELET # BLD AUTO: 101 X10*3/UL (ref 150–450)
PLATELET # BLD AUTO: 107 X10*3/UL (ref 150–450)
POTASSIUM SERPL-SCNC: 3.8 MMOL/L (ref 3.5–5.3)
PROT SERPL-MCNC: 8.3 G/DL (ref 6.4–8.2)
PROT UR STRIP.AUTO-MCNC: ABNORMAL MG/DL
RBC # BLD AUTO: 4.98 X10*6/UL (ref 4.5–5.9)
RBC # BLD AUTO: 5.7 X10*6/UL (ref 4.5–5.9)
RBC # UR STRIP.AUTO: NEGATIVE /UL
RBC #/AREA URNS AUTO: NORMAL /HPF
SODIUM SERPL-SCNC: 140 MMOL/L (ref 136–145)
SP GR UR STRIP.AUTO: 1.02
UROBILINOGEN UR STRIP.AUTO-MCNC: NORMAL MG/DL
WBC # BLD AUTO: 10 X10*3/UL (ref 4.4–11.3)
WBC # BLD AUTO: 12.6 X10*3/UL (ref 4.4–11.3)
WBC #/AREA URNS AUTO: NORMAL /HPF

## 2024-11-24 PROCEDURE — 2550000001 HC RX 255 CONTRASTS: Performed by: STUDENT IN AN ORGANIZED HEALTH CARE EDUCATION/TRAINING PROGRAM

## 2024-11-24 PROCEDURE — 36415 COLL VENOUS BLD VENIPUNCTURE: CPT | Performed by: STUDENT IN AN ORGANIZED HEALTH CARE EDUCATION/TRAINING PROGRAM

## 2024-11-24 PROCEDURE — 74178 CT ABD&PLV WO CNTR FLWD CNTR: CPT

## 2024-11-24 PROCEDURE — 2500000004 HC RX 250 GENERAL PHARMACY W/ HCPCS (ALT 636 FOR OP/ED): Performed by: STUDENT IN AN ORGANIZED HEALTH CARE EDUCATION/TRAINING PROGRAM

## 2024-11-24 PROCEDURE — 81001 URINALYSIS AUTO W/SCOPE: CPT | Performed by: STUDENT IN AN ORGANIZED HEALTH CARE EDUCATION/TRAINING PROGRAM

## 2024-11-24 PROCEDURE — 2500000004 HC RX 250 GENERAL PHARMACY W/ HCPCS (ALT 636 FOR OP/ED)

## 2024-11-24 PROCEDURE — 96372 THER/PROPH/DIAG INJ SC/IM: CPT | Performed by: STUDENT IN AN ORGANIZED HEALTH CARE EDUCATION/TRAINING PROGRAM

## 2024-11-24 PROCEDURE — 80053 COMPREHEN METABOLIC PANEL: CPT | Performed by: STUDENT IN AN ORGANIZED HEALTH CARE EDUCATION/TRAINING PROGRAM

## 2024-11-24 PROCEDURE — 93005 ELECTROCARDIOGRAM TRACING: CPT

## 2024-11-24 PROCEDURE — 83735 ASSAY OF MAGNESIUM: CPT | Performed by: STUDENT IN AN ORGANIZED HEALTH CARE EDUCATION/TRAINING PROGRAM

## 2024-11-24 PROCEDURE — 96374 THER/PROPH/DIAG INJ IV PUSH: CPT

## 2024-11-24 PROCEDURE — 96376 TX/PRO/DX INJ SAME DRUG ADON: CPT

## 2024-11-24 PROCEDURE — 96375 TX/PRO/DX INJ NEW DRUG ADDON: CPT

## 2024-11-24 PROCEDURE — 85025 COMPLETE CBC W/AUTO DIFF WBC: CPT | Performed by: STUDENT IN AN ORGANIZED HEALTH CARE EDUCATION/TRAINING PROGRAM

## 2024-11-24 PROCEDURE — 83605 ASSAY OF LACTIC ACID: CPT | Performed by: STUDENT IN AN ORGANIZED HEALTH CARE EDUCATION/TRAINING PROGRAM

## 2024-11-24 PROCEDURE — 99285 EMERGENCY DEPT VISIT HI MDM: CPT | Mod: 25

## 2024-11-24 PROCEDURE — 74178 CT ABD&PLV WO CNTR FLWD CNTR: CPT | Performed by: RADIOLOGY

## 2024-11-24 PROCEDURE — 74174 CTA ABD&PLVS W/CONTRAST: CPT

## 2024-11-24 PROCEDURE — 83690 ASSAY OF LIPASE: CPT | Performed by: STUDENT IN AN ORGANIZED HEALTH CARE EDUCATION/TRAINING PROGRAM

## 2024-11-24 RX ORDER — ONDANSETRON HYDROCHLORIDE 2 MG/ML
4 INJECTION, SOLUTION INTRAVENOUS ONCE
Status: COMPLETED | OUTPATIENT
Start: 2024-11-24 | End: 2024-11-24

## 2024-11-24 RX ORDER — DICYCLOMINE HYDROCHLORIDE 10 MG/ML
20 INJECTION INTRAMUSCULAR ONCE
Status: COMPLETED | OUTPATIENT
Start: 2024-11-24 | End: 2024-11-24

## 2024-11-24 ASSESSMENT — COLUMBIA-SUICIDE SEVERITY RATING SCALE - C-SSRS
6. HAVE YOU EVER DONE ANYTHING, STARTED TO DO ANYTHING, OR PREPARED TO DO ANYTHING TO END YOUR LIFE?: NO
1. IN THE PAST MONTH, HAVE YOU WISHED YOU WERE DEAD OR WISHED YOU COULD GO TO SLEEP AND NOT WAKE UP?: NO
2. HAVE YOU ACTUALLY HAD ANY THOUGHTS OF KILLING YOURSELF?: NO

## 2024-11-24 ASSESSMENT — PAIN DESCRIPTION - DESCRIPTORS
DESCRIPTORS: DISCOMFORT
DESCRIPTORS: ACHING

## 2024-11-24 ASSESSMENT — PAIN SCALES - GENERAL
PAINLEVEL_OUTOF10: 8
PAINLEVEL_OUTOF10: 3
PAINLEVEL_OUTOF10: 5 - MODERATE PAIN
PAINLEVEL_OUTOF10: 8
PAINLEVEL_OUTOF10: 2

## 2024-11-24 ASSESSMENT — PAIN - FUNCTIONAL ASSESSMENT
PAIN_FUNCTIONAL_ASSESSMENT: 0-10

## 2024-11-24 ASSESSMENT — PAIN DESCRIPTION - PAIN TYPE
TYPE: ACUTE PAIN

## 2024-11-24 ASSESSMENT — PAIN DESCRIPTION - LOCATION
LOCATION: ABDOMEN

## 2024-11-24 ASSESSMENT — PAIN DESCRIPTION - ORIENTATION
ORIENTATION: LOWER
ORIENTATION: LEFT;LOWER
ORIENTATION: LEFT;LOWER

## 2024-11-24 ASSESSMENT — PAIN DESCRIPTION - PROGRESSION: CLINICAL_PROGRESSION: RAPIDLY IMPROVING

## 2024-11-24 NOTE — ED TRIAGE NOTES
Pt to ED for c/o localized LLQ abdominal pain since 3 am, also endorse nausea and loose stools with blood. Denies emesis, dysuria, blood thinner use, hx of Colitis or diverticulitis. Abdomen tender in LLQ. VSS, skin p/w/d, resps even and regular.

## 2024-11-25 LAB
BASOPHILS # BLD AUTO: 0.03 X10*3/UL (ref 0–0.1)
BASOPHILS NFR BLD AUTO: 0.2 %
EOSINOPHIL # BLD AUTO: 0.02 X10*3/UL (ref 0–0.7)
EOSINOPHIL NFR BLD AUTO: 0.2 %
ERYTHROCYTE [DISTWIDTH] IN BLOOD BY AUTOMATED COUNT: 13.2 % (ref 11.5–14.5)
HCT VFR BLD AUTO: 43.7 % (ref 41–52)
HGB BLD-MCNC: 15 G/DL (ref 13.5–17.5)
HOLD SPECIMEN: NORMAL
IMM GRANULOCYTES # BLD AUTO: 0.04 X10*3/UL (ref 0–0.7)
IMM GRANULOCYTES NFR BLD AUTO: 0.3 % (ref 0–0.9)
LYMPHOCYTES # BLD AUTO: 1.26 X10*3/UL (ref 1.2–4.8)
LYMPHOCYTES NFR BLD AUTO: 10 %
MCH RBC QN AUTO: 30.6 PG (ref 26–34)
MCHC RBC AUTO-ENTMCNC: 34.3 G/DL (ref 32–36)
MCV RBC AUTO: 89 FL (ref 80–100)
MONOCYTES # BLD AUTO: 1.21 X10*3/UL (ref 0.1–1)
MONOCYTES NFR BLD AUTO: 9.6 %
NEUTROPHILS # BLD AUTO: 10.08 X10*3/UL (ref 1.2–7.7)
NEUTROPHILS NFR BLD AUTO: 79.7 %
NRBC BLD-RTO: 0 /100 WBCS (ref 0–0)
PLATELET # BLD AUTO: 90 X10*3/UL (ref 150–450)
RBC # BLD AUTO: 4.9 X10*6/UL (ref 4.5–5.9)
WBC # BLD AUTO: 12.6 X10*3/UL (ref 4.4–11.3)

## 2024-11-25 PROCEDURE — 36415 COLL VENOUS BLD VENIPUNCTURE: CPT | Performed by: EMERGENCY MEDICINE

## 2024-11-25 PROCEDURE — 96375 TX/PRO/DX INJ NEW DRUG ADDON: CPT

## 2024-11-25 PROCEDURE — 2500000004 HC RX 250 GENERAL PHARMACY W/ HCPCS (ALT 636 FOR OP/ED): Performed by: EMERGENCY MEDICINE

## 2024-11-25 PROCEDURE — 96376 TX/PRO/DX INJ SAME DRUG ADON: CPT

## 2024-11-25 PROCEDURE — 85025 COMPLETE CBC W/AUTO DIFF WBC: CPT | Performed by: EMERGENCY MEDICINE

## 2024-11-25 RX ORDER — FLUTICASONE PROPIONATE 50 MCG
1-2 SPRAY, SUSPENSION (ML) NASAL DAILY PRN
COMMUNITY

## 2024-11-25 RX ORDER — LUBRICANT EYE DROPS 3; 10 MG/ML; MG/ML
1 SOLUTION/ DROPS OPHTHALMIC DAILY
COMMUNITY
End: 2024-11-28 | Stop reason: HOSPADM

## 2024-11-25 RX ORDER — PANTOPRAZOLE SODIUM 40 MG/10ML
40 INJECTION, POWDER, LYOPHILIZED, FOR SOLUTION INTRAVENOUS ONCE
Status: COMPLETED | OUTPATIENT
Start: 2024-11-25 | End: 2024-11-25

## 2024-11-25 RX ORDER — PANTOPRAZOLE SODIUM 40 MG/10ML
40 INJECTION, POWDER, LYOPHILIZED, FOR SOLUTION INTRAVENOUS DAILY
Status: DISCONTINUED | OUTPATIENT
Start: 2024-11-25 | End: 2024-11-26 | Stop reason: HOSPADM

## 2024-11-25 ASSESSMENT — PAIN DESCRIPTION - LOCATION
LOCATION: ABDOMEN

## 2024-11-25 ASSESSMENT — PAIN SCALES - GENERAL
PAINLEVEL_OUTOF10: 7
PAINLEVEL_OUTOF10: 8
PAINLEVEL_OUTOF10: 7
PAINLEVEL_OUTOF10: 8

## 2024-11-25 NOTE — PROGRESS NOTES
Emergency Medicine Transition of Care Note.    I received Alexandre Poole in signout from Dr. BARBARA Ding.  Please see the previous ED provider note for all HPI, PE and MDM up to the time of signout at 1900 on 11/24/24. This is in addition to the primary record.    In brief Alexandre Poole is an 64 y.o. male presenting for   Chief Complaint   Patient presents with    Abdominal Pain     Pt to ED for c/o localized LLQ abdominal pain since 3 am, also endorse nausea and loose stools with blood. Denies emesis, dysuria, blood thinner use, hx of Colitis or diverticulitis. Abdomen tender in LLQ. VSS, skin p/w/d, resps even and regular.     At the time of signout we were awaiting: Transfer to [facility]    Patient presented with left lower quadrant abdominal pain that started about 3 AM.  Patient complained of some loose stools with some blood associated with it.  Some nausea but no vomiting.  No blood thinner use.  No history of colitis or diverticulitis.  Patient had left lower quadrant abdominal pain.    Blood work was reviewed.  White count 12.6, hemoglobin 15.1 hematocrit 43.2, platelet count 101, no shift on differential  Urinalysis negative for infection  Comprehensive metabolic panel is grossly unremarkable.    CT Abd/Pelvis:  IMPRESSION:  1.  Left bernie colitis sparing the rectum as detailed above.  Infectious colitis or possibly colitis due to ischemia could have  this appearance.  2. Enlarged prostate with probable thickening of the bladder wall  though the bladder is not well distended.        ED Course as of 11/25/24 0643   Sun Nov 24, 2024   0993 Dr. Ceja, [place] [facility] [facility]      ED Course User Index  [JH] Lainey Ding,          Diagnoses as of 11/25/24 0643   Gastrointestinal hemorrhage, unspecified gastrointestinal hemorrhage type       Medical Decision Making  Patient awaiting transfer to [facility] Dr Ceja accepted.    Patient had an uneventful overnight here.  Did  require to small doses of Dilaudid for pain control.        Final diagnoses:   [K92.2] Gastrointestinal hemorrhage, unspecified gastrointestinal hemorrhage type           Procedure  Procedures    Bennett Dolan DO

## 2024-11-25 NOTE — PROGRESS NOTES
I evaluated the patient and discussed the treatment, assessment and plan.   Patient was checked out to me by Dr. Dial.  Patient waiting a bed at [facility].  We were just notified patient does have a bed at [facility] but waiting for it to be clean.  I did reevaluate patient said he has no complaints other than just mild left abdominal discomfort he does have some tenderness in the left lower quadrant otherwise he appears to be resting comfortable his vitals are unremarkable and he is in no distress at the current time.  Patient may be checked out to over night doctor if he has not been transferred prior to the end of my shift.

## 2024-11-25 NOTE — PROGRESS NOTES
Pharmacy Medication History Review    Alexandre Poole is a 64 y.o. male admitted for No Principal Problem: There is no principal problem currently on the Problem List. Please update the Problem List and refresh.. Pharmacy reviewed the patient's opadr-pf-soxafrdov medications and allergies for accuracy.    Medications ADDED:  Fluticasone Nasal Tarkio  Z-Stack  Grzegorz's Wort  Probiotic  Medications CHANGED:  Ibuprofen  Medications REMOVED/NOT TAKING:   Calcium/Vitamin D  Zinc Sulfate     The list below reflects the updated PTA list.   Prior to Admission Medications   Prescriptions Last Dose Informant   Lactobacillus acidophilus (PROBIOTIC ORAL)  Self   Sig: Take 1 tablet by mouth once daily.   NON FORMULARY  Self   Sig: Take 1 each by mouth 2 times a day. Z-Stack (Vitamin C 800 mg, Vitamin D3 125 mcg, Zinc 30 mg, Quercetin 500 mg)   Katelin's wort 300 mg tablet  Self   Sig: Take 1 tablet by mouth once daily.   amLODIPine (Norvasc) 5 mg tablet 11/23/2024 Self   Sig: TAKE 1 TABLET BY MOUTH EVERY DAY   fluticasone (Flonase) 50 mcg/actuation nasal spray Past Month Self   Sig: Administer 1-2 sprays into each nostril once daily as needed for rhinitis or allergies. Shake gently. Before first use, prime pump. After use, clean tip and replace cap.   ibuprofen (MOTRIN ORAL)  Self   Sig: Take 800 mg by mouth 2 times a day as needed (pain).   lisinopriL-hydrochlorothiazide 20-25 mg tablet 11/23/2024 Self   Sig: TAKE 1 TABLET BY MOUTH EVERY DAY      Facility-Administered Medications: None        The list below reflects the updated allergy list. Please review each documented allergy for additional clarification and justification.  Allergies  Reviewed by Nellie Castro [facility] on 11/25/2024   No Known Allergies         Patient was unable to be assessed for M2B at discharge.     Sources:   Patient interview via phone (good historian)  [facility]  Care Everywhere  Medication fill history    Additional Comments:  Patient boarding in  "[facility] Emergency Department pending transfer to [facility].      Nellie Castro [facility]  Transitions of Care Pharmacist  11/25/24     Secure Chat preferred   If no response call [phone] or Vocera \"Med Rec\"    "

## 2024-11-26 ENCOUNTER — HOSPITAL ENCOUNTER (INPATIENT)
Facility: HOSPITAL | Age: 64
LOS: 2 days | Discharge: HOME | End: 2024-11-28
Attending: INTERNAL MEDICINE | Admitting: INTERNAL MEDICINE
Payer: COMMERCIAL

## 2024-11-26 VITALS
WEIGHT: 194.67 LBS | HEIGHT: 69 IN | BODY MASS INDEX: 28.83 KG/M2 | OXYGEN SATURATION: 96 % | HEART RATE: 69 BPM | TEMPERATURE: 98.4 F | SYSTOLIC BLOOD PRESSURE: 110 MMHG | RESPIRATION RATE: 16 BRPM | DIASTOLIC BLOOD PRESSURE: 63 MMHG

## 2024-11-26 DIAGNOSIS — K92.1 HEMATOCHEZIA: Primary | ICD-10-CM

## 2024-11-26 DIAGNOSIS — F10.10 ALCOHOL USE DISORDER, MILD, ABUSE: ICD-10-CM

## 2024-11-26 DIAGNOSIS — R93.5 ABNORMAL CT OF THE ABDOMEN: ICD-10-CM

## 2024-11-26 LAB
A1AT SERPL NEPH-MCNC: 183 MG/DL (ref 84–218)
AFP SERPL-MCNC: <4 NG/ML (ref 0–9)
ALBUMIN SERPL BCP-MCNC: 4.4 G/DL (ref 3.4–5)
ALP SERPL-CCNC: 40 U/L (ref 33–136)
ALT SERPL W P-5'-P-CCNC: 43 U/L (ref 10–52)
ANION GAP SERPL CALC-SCNC: 12 MMOL/L (ref 10–20)
APTT PPP: 31 SECONDS (ref 27–38)
AST SERPL W P-5'-P-CCNC: 43 U/L (ref 9–39)
ATRIAL RATE: 71 BPM
BASOPHILS # BLD AUTO: 0.04 X10*3/UL (ref 0–0.1)
BASOPHILS NFR BLD AUTO: 0.3 %
BILIRUB DIRECT SERPL-MCNC: 0.2 MG/DL (ref 0–0.3)
BILIRUB SERPL-MCNC: 1 MG/DL (ref 0–1.2)
BUN SERPL-MCNC: 16 MG/DL (ref 6–23)
C DIF TOX TCDA+TCDB STL QL NAA+PROBE: NOT DETECTED
CALCIUM SERPL-MCNC: 9 MG/DL (ref 8.6–10.6)
CERULOPLASMIN SERPL-MCNC: 21.3 MG/DL (ref 20–60)
CHLORIDE SERPL-SCNC: 101 MMOL/L (ref 98–107)
CHOLEST SERPL-MCNC: 149 MG/DL (ref 0–199)
CHOLESTEROL/HDL RATIO: 2.5
CO2 SERPL-SCNC: 31 MMOL/L (ref 21–32)
CREAT SERPL-MCNC: 0.77 MG/DL (ref 0.5–1.3)
EGFRCR SERPLBLD CKD-EPI 2021: >90 ML/MIN/1.73M*2
EOSINOPHIL # BLD AUTO: 0.04 X10*3/UL (ref 0–0.7)
EOSINOPHIL NFR BLD AUTO: 0.3 %
ERYTHROCYTE [DISTWIDTH] IN BLOOD BY AUTOMATED COUNT: 13.1 % (ref 11.5–14.5)
ETHANOL SERPL-MCNC: <10 MG/DL
FERRITIN SERPL-MCNC: 636 NG/ML (ref 20–300)
FOLATE SERPL-MCNC: 12.6 NG/ML
GLUCOSE SERPL-MCNC: 89 MG/DL (ref 74–99)
HAV IGM SER QL: NONREACTIVE
HBV CORE IGM SER QL: NONREACTIVE
HBV SURFACE AG SERPL QL IA: NONREACTIVE
HCT VFR BLD AUTO: 46 % (ref 41–52)
HCV AB SER QL: NONREACTIVE
HDLC SERPL-MCNC: 60.5 MG/DL
HGB BLD-MCNC: 15.8 G/DL (ref 13.5–17.5)
IGG4 SER-MCNC: 42 MG/DL (ref 3–200)
IMM GRANULOCYTES # BLD AUTO: 0.04 X10*3/UL (ref 0–0.7)
IMM GRANULOCYTES NFR BLD AUTO: 0.3 % (ref 0–0.9)
INR PPP: 1.1 (ref 0.9–1.1)
IRON SATN MFR SERPL: 18 % (ref 25–45)
IRON SERPL-MCNC: 54 UG/DL (ref 35–150)
LACTATE SERPL-SCNC: 0.9 MMOL/L (ref 0.4–2)
LDLC SERPL CALC-MCNC: 67 MG/DL
LYMPHOCYTES # BLD AUTO: 1.48 X10*3/UL (ref 1.2–4.8)
LYMPHOCYTES NFR BLD AUTO: 12.5 %
MAGNESIUM SERPL-MCNC: 2.13 MG/DL (ref 1.6–2.4)
MCH RBC QN AUTO: 30.3 PG (ref 26–34)
MCHC RBC AUTO-ENTMCNC: 34.3 G/DL (ref 32–36)
MCV RBC AUTO: 88 FL (ref 80–100)
MONOCYTES # BLD AUTO: 1.31 X10*3/UL (ref 0.1–1)
MONOCYTES NFR BLD AUTO: 11 %
NEUTROPHILS # BLD AUTO: 8.97 X10*3/UL (ref 1.2–7.7)
NEUTROPHILS NFR BLD AUTO: 75.6 %
NON HDL CHOLESTEROL: 89 MG/DL (ref 0–149)
NRBC BLD-RTO: 0 /100 WBCS (ref 0–0)
P AXIS: 42 DEGREES
P OFFSET: 195 MS
P ONSET: 131 MS
PHOSPHATE SERPL-MCNC: 3 MG/DL (ref 2.5–4.9)
PLATELET # BLD AUTO: 93 X10*3/UL (ref 150–450)
POTASSIUM SERPL-SCNC: 3.4 MMOL/L (ref 3.5–5.3)
PR INTERVAL: 190 MS
PROT SERPL-MCNC: 7.5 G/DL (ref 6.4–8.2)
PROTHROMBIN TIME: 12.3 SECONDS (ref 9.8–12.8)
Q ONSET: 226 MS
QRS COUNT: 12 BEATS
QRS DURATION: 90 MS
QT INTERVAL: 398 MS
QTC CALCULATION(BAZETT): 432 MS
QTC FREDERICIA: 421 MS
R AXIS: 26 DEGREES
RBC # BLD AUTO: 5.21 X10*6/UL (ref 4.5–5.9)
SODIUM SERPL-SCNC: 141 MMOL/L (ref 136–145)
T AXIS: 38 DEGREES
T OFFSET: 425 MS
TIBC SERPL-MCNC: 299 UG/DL (ref 240–445)
TRANSFERRIN SERPL-MCNC: 216 MG/DL (ref 200–360)
TRIGL SERPL-MCNC: 108 MG/DL (ref 0–149)
UIBC SERPL-MCNC: 245 UG/DL (ref 110–370)
VENTRICULAR RATE: 71 BPM
VIT B12 SERPL-MCNC: 398 PG/ML (ref 211–911)
VLDL: 22 MG/DL (ref 0–40)
WBC # BLD AUTO: 11.9 X10*3/UL (ref 4.4–11.3)

## 2024-11-26 PROCEDURE — 82746 ASSAY OF FOLIC ACID SERUM: CPT

## 2024-11-26 PROCEDURE — 86015 ACTIN ANTIBODY EACH: CPT

## 2024-11-26 PROCEDURE — 85610 PROTHROMBIN TIME: CPT

## 2024-11-26 PROCEDURE — 82077 ASSAY SPEC XCP UR&BREATH IA: CPT

## 2024-11-26 PROCEDURE — 2500000002 HC RX 250 W HCPCS SELF ADMINISTERED DRUGS (ALT 637 FOR MEDICARE OP, ALT 636 FOR OP/ED)

## 2024-11-26 PROCEDURE — 99223 1ST HOSP IP/OBS HIGH 75: CPT

## 2024-11-26 PROCEDURE — 80074 ACUTE HEPATITIS PANEL: CPT

## 2024-11-26 PROCEDURE — 87506 IADNA-DNA/RNA PROBE TQ 6-11: CPT

## 2024-11-26 PROCEDURE — 82103 ALPHA-1-ANTITRYPSIN TOTAL: CPT

## 2024-11-26 PROCEDURE — 83605 ASSAY OF LACTIC ACID: CPT

## 2024-11-26 PROCEDURE — 82728 ASSAY OF FERRITIN: CPT

## 2024-11-26 PROCEDURE — 82105 ALPHA-FETOPROTEIN SERUM: CPT

## 2024-11-26 PROCEDURE — 87328 CRYPTOSPORIDIUM AG IA: CPT

## 2024-11-26 PROCEDURE — 2500000004 HC RX 250 GENERAL PHARMACY W/ HCPCS (ALT 636 FOR OP/ED)

## 2024-11-26 PROCEDURE — 85025 COMPLETE CBC W/AUTO DIFF WBC: CPT

## 2024-11-26 PROCEDURE — 84100 ASSAY OF PHOSPHORUS: CPT

## 2024-11-26 PROCEDURE — 80061 LIPID PANEL: CPT

## 2024-11-26 PROCEDURE — 86038 ANTINUCLEAR ANTIBODIES: CPT

## 2024-11-26 PROCEDURE — 2500000001 HC RX 250 WO HCPCS SELF ADMINISTERED DRUGS (ALT 637 FOR MEDICARE OP)

## 2024-11-26 PROCEDURE — 87040 BLOOD CULTURE FOR BACTERIA: CPT

## 2024-11-26 PROCEDURE — 83921 ORGANIC ACID SINGLE QUANT: CPT

## 2024-11-26 PROCEDURE — 82787 IGG 1 2 3 OR 4 EACH: CPT

## 2024-11-26 PROCEDURE — 82248 BILIRUBIN DIRECT: CPT

## 2024-11-26 PROCEDURE — 83540 ASSAY OF IRON: CPT

## 2024-11-26 PROCEDURE — 82390 ASSAY OF CERULOPLASMIN: CPT

## 2024-11-26 PROCEDURE — 1100000001 HC PRIVATE ROOM DAILY

## 2024-11-26 PROCEDURE — 83735 ASSAY OF MAGNESIUM: CPT

## 2024-11-26 PROCEDURE — 86706 HEP B SURFACE ANTIBODY: CPT

## 2024-11-26 PROCEDURE — 86381 MITOCHONDRIAL ANTIBODY EACH: CPT

## 2024-11-26 PROCEDURE — 87493 C DIFF AMPLIFIED PROBE: CPT

## 2024-11-26 PROCEDURE — 99221 1ST HOSP IP/OBS SF/LOW 40: CPT | Performed by: STUDENT IN AN ORGANIZED HEALTH CARE EDUCATION/TRAINING PROGRAM

## 2024-11-26 PROCEDURE — 36415 COLL VENOUS BLD VENIPUNCTURE: CPT

## 2024-11-26 PROCEDURE — 82607 VITAMIN B-12: CPT

## 2024-11-26 PROCEDURE — 84466 ASSAY OF TRANSFERRIN: CPT

## 2024-11-26 PROCEDURE — 87329 GIARDIA AG IA: CPT

## 2024-11-26 RX ORDER — ACETAMINOPHEN 160 MG/5ML
650 SOLUTION ORAL EVERY 4 HOURS PRN
Status: DISCONTINUED | OUTPATIENT
Start: 2024-11-26 | End: 2024-11-26

## 2024-11-26 RX ORDER — POLYETHYLENE GLYCOL 3350, SODIUM CHLORIDE, SODIUM BICARBONATE, POTASSIUM CHLORIDE 420; 11.2; 5.72; 1.48 G/4L; G/4L; G/4L; G/4L
2000 POWDER, FOR SOLUTION ORAL ONCE AS NEEDED
Status: DISCONTINUED | OUTPATIENT
Start: 2024-11-26 | End: 2024-11-28 | Stop reason: HOSPADM

## 2024-11-26 RX ORDER — PANTOPRAZOLE SODIUM 40 MG/10ML
40 INJECTION, POWDER, LYOPHILIZED, FOR SOLUTION INTRAVENOUS 2 TIMES DAILY
Status: DISCONTINUED | OUTPATIENT
Start: 2024-11-26 | End: 2024-11-26

## 2024-11-26 RX ORDER — ACETAMINOPHEN 160 MG/5ML
650 SOLUTION ORAL EVERY 4 HOURS PRN
Status: DISCONTINUED | OUTPATIENT
Start: 2024-11-26 | End: 2024-11-28 | Stop reason: HOSPADM

## 2024-11-26 RX ORDER — SODIUM CHLORIDE, SODIUM LACTATE, POTASSIUM CHLORIDE, CALCIUM CHLORIDE 600; 310; 30; 20 MG/100ML; MG/100ML; MG/100ML; MG/100ML
125 INJECTION, SOLUTION INTRAVENOUS CONTINUOUS
Status: ACTIVE | OUTPATIENT
Start: 2024-11-26 | End: 2024-11-27

## 2024-11-26 RX ORDER — POLYETHYLENE GLYCOL 3350, SODIUM CHLORIDE, SODIUM BICARBONATE, POTASSIUM CHLORIDE 420; 11.2; 5.72; 1.48 G/4L; G/4L; G/4L; G/4L
4000 POWDER, FOR SOLUTION ORAL ONCE
Status: COMPLETED | OUTPATIENT
Start: 2024-11-26 | End: 2024-11-26

## 2024-11-26 RX ORDER — METRONIDAZOLE 500 MG/100ML
500 INJECTION, SOLUTION INTRAVENOUS EVERY 8 HOURS
Status: DISCONTINUED | OUTPATIENT
Start: 2024-11-26 | End: 2024-11-28

## 2024-11-26 RX ORDER — ONDANSETRON HYDROCHLORIDE 2 MG/ML
4 INJECTION, SOLUTION INTRAVENOUS EVERY 4 HOURS PRN
Status: DISCONTINUED | OUTPATIENT
Start: 2024-11-26 | End: 2024-11-28 | Stop reason: HOSPADM

## 2024-11-26 RX ORDER — POTASSIUM CHLORIDE 20 MEQ/1
40 TABLET, EXTENDED RELEASE ORAL ONCE
Status: COMPLETED | OUTPATIENT
Start: 2024-11-26 | End: 2024-11-26

## 2024-11-26 RX ORDER — HYDROMORPHONE HYDROCHLORIDE 1 MG/ML
0.5 INJECTION, SOLUTION INTRAMUSCULAR; INTRAVENOUS; SUBCUTANEOUS EVERY 4 HOURS PRN
Status: DISCONTINUED | OUTPATIENT
Start: 2024-11-26 | End: 2024-11-28 | Stop reason: HOSPADM

## 2024-11-26 RX ORDER — ACETAMINOPHEN 650 MG/1
650 SUPPOSITORY RECTAL EVERY 4 HOURS PRN
Status: DISCONTINUED | OUTPATIENT
Start: 2024-11-26 | End: 2024-11-26

## 2024-11-26 RX ORDER — THIAMINE HYDROCHLORIDE 100 MG/ML
100 INJECTION, SOLUTION INTRAMUSCULAR; INTRAVENOUS DAILY
Status: COMPLETED | OUTPATIENT
Start: 2024-11-26 | End: 2024-11-28

## 2024-11-26 RX ORDER — AMLODIPINE BESYLATE 5 MG/1
5 TABLET ORAL DAILY
Status: DISCONTINUED | OUTPATIENT
Start: 2024-11-26 | End: 2024-11-28 | Stop reason: HOSPADM

## 2024-11-26 RX ORDER — FOLIC ACID 1 MG/1
1 TABLET ORAL DAILY
Status: DISCONTINUED | OUTPATIENT
Start: 2024-11-26 | End: 2024-11-28 | Stop reason: HOSPADM

## 2024-11-26 RX ORDER — ONDANSETRON HYDROCHLORIDE 2 MG/ML
4 INJECTION, SOLUTION INTRAVENOUS EVERY 8 HOURS PRN
Status: DISCONTINUED | OUTPATIENT
Start: 2024-11-26 | End: 2024-11-26

## 2024-11-26 RX ORDER — CEFTRIAXONE 1 G/50ML
1 INJECTION, SOLUTION INTRAVENOUS EVERY 24 HOURS
Status: DISCONTINUED | OUTPATIENT
Start: 2024-11-26 | End: 2024-11-28

## 2024-11-26 RX ORDER — ACETAMINOPHEN 325 MG/1
650 TABLET ORAL EVERY 4 HOURS PRN
Status: DISCONTINUED | OUTPATIENT
Start: 2024-11-26 | End: 2024-11-26

## 2024-11-26 RX ORDER — MULTIVIT-MIN/IRON FUM/FOLIC AC 7.5 MG-4
1 TABLET ORAL DAILY
Status: DISCONTINUED | OUTPATIENT
Start: 2024-11-26 | End: 2024-11-28 | Stop reason: HOSPADM

## 2024-11-26 RX ORDER — LANOLIN ALCOHOL/MO/W.PET/CERES
100 CREAM (GRAM) TOPICAL DAILY
Status: DISCONTINUED | OUTPATIENT
Start: 2024-11-29 | End: 2024-11-28 | Stop reason: HOSPADM

## 2024-11-26 RX ORDER — ACETAMINOPHEN 650 MG/1
650 SUPPOSITORY RECTAL EVERY 4 HOURS PRN
Status: DISCONTINUED | OUTPATIENT
Start: 2024-11-26 | End: 2024-11-28 | Stop reason: HOSPADM

## 2024-11-26 RX ORDER — ACETAMINOPHEN 325 MG/1
650 TABLET ORAL EVERY 4 HOURS PRN
Status: DISCONTINUED | OUTPATIENT
Start: 2024-11-26 | End: 2024-11-28 | Stop reason: HOSPADM

## 2024-11-26 RX ADMIN — AMLODIPINE BESYLATE 5 MG: 5 TABLET ORAL at 09:15

## 2024-11-26 RX ADMIN — THIAMINE HYDROCHLORIDE 100 MG: 100 INJECTION, SOLUTION INTRAMUSCULAR; INTRAVENOUS at 09:15

## 2024-11-26 RX ADMIN — HYDROCHLOROTHIAZIDE: 25 TABLET ORAL at 09:14

## 2024-11-26 RX ADMIN — METRONIDAZOLE 500 MG: 500 INJECTION, SOLUTION INTRAVENOUS at 03:15

## 2024-11-26 RX ADMIN — PANTOPRAZOLE SODIUM 40 MG: 40 INJECTION, POWDER, FOR SOLUTION INTRAVENOUS at 09:15

## 2024-11-26 RX ADMIN — SODIUM CHLORIDE, POTASSIUM CHLORIDE, SODIUM LACTATE AND CALCIUM CHLORIDE 125 ML/HR: 600; 310; 30; 20 INJECTION, SOLUTION INTRAVENOUS at 18:34

## 2024-11-26 RX ADMIN — METRONIDAZOLE 500 MG: 500 INJECTION, SOLUTION INTRAVENOUS at 10:18

## 2024-11-26 RX ADMIN — HYDROMORPHONE HYDROCHLORIDE 0.5 MG: 1 INJECTION, SOLUTION INTRAMUSCULAR; INTRAVENOUS; SUBCUTANEOUS at 03:30

## 2024-11-26 RX ADMIN — ONDANSETRON 4 MG: 2 INJECTION INTRAMUSCULAR; INTRAVENOUS at 19:55

## 2024-11-26 RX ADMIN — POLYETHYLENE GLYCOL 3350, SODIUM SULFATE ANHYDROUS, SODIUM BICARBONATE, SODIUM CHLORIDE, POTASSIUM CHLORIDE 4000 ML: 236; 22.74; 6.74; 5.86; 2.97 POWDER, FOR SOLUTION ORAL at 18:31

## 2024-11-26 RX ADMIN — FOLIC ACID 1 MG: 1 TABLET ORAL at 09:15

## 2024-11-26 RX ADMIN — PANTOPRAZOLE SODIUM 40 MG: 40 INJECTION, POWDER, FOR SOLUTION INTRAVENOUS at 03:21

## 2024-11-26 RX ADMIN — Medication 1 TABLET: at 09:15

## 2024-11-26 RX ADMIN — METRONIDAZOLE 500 MG: 500 INJECTION, SOLUTION INTRAVENOUS at 18:34

## 2024-11-26 RX ADMIN — CEFTRIAXONE SODIUM 1 G: 1 INJECTION, SOLUTION INTRAVENOUS at 03:22

## 2024-11-26 RX ADMIN — POTASSIUM CHLORIDE 40 MEQ: 1500 TABLET, EXTENDED RELEASE ORAL at 09:15

## 2024-11-26 RX ADMIN — HYDROMORPHONE HYDROCHLORIDE 0.5 MG: 1 INJECTION, SOLUTION INTRAMUSCULAR; INTRAVENOUS; SUBCUTANEOUS at 09:17

## 2024-11-26 SDOH — SOCIAL STABILITY: SOCIAL INSECURITY: DO YOU FEEL UNSAFE GOING BACK TO THE PLACE WHERE YOU ARE LIVING?: NO

## 2024-11-26 SDOH — SOCIAL STABILITY: SOCIAL INSECURITY: HAVE YOU HAD ANY THOUGHTS OF HARMING ANYONE ELSE?: NO

## 2024-11-26 SDOH — ECONOMIC STABILITY: HOUSING INSECURITY: IN THE LAST 12 MONTHS, WAS THERE A TIME WHEN YOU WERE NOT ABLE TO PAY THE MORTGAGE OR RENT ON TIME?: NO

## 2024-11-26 SDOH — ECONOMIC STABILITY: FOOD INSECURITY: WITHIN THE PAST 12 MONTHS, YOU WORRIED THAT YOUR FOOD WOULD RUN OUT BEFORE YOU GOT THE MONEY TO BUY MORE.: NEVER TRUE

## 2024-11-26 SDOH — SOCIAL STABILITY: SOCIAL INSECURITY: WITHIN THE LAST YEAR, HAVE YOU BEEN HUMILIATED OR EMOTIONALLY ABUSED IN OTHER WAYS BY YOUR PARTNER OR EX-PARTNER?: NO

## 2024-11-26 SDOH — SOCIAL STABILITY: SOCIAL INSECURITY: ARE YOU OR HAVE YOU BEEN THREATENED OR ABUSED PHYSICALLY, EMOTIONALLY, OR SEXUALLY BY ANYONE?: NO

## 2024-11-26 SDOH — ECONOMIC STABILITY: INCOME INSECURITY: IN THE PAST 12 MONTHS HAS THE ELECTRIC, GAS, OIL, OR WATER COMPANY THREATENED TO SHUT OFF SERVICES IN YOUR HOME?: NO

## 2024-11-26 SDOH — ECONOMIC STABILITY: FOOD INSECURITY: WITHIN THE PAST 12 MONTHS, THE FOOD YOU BOUGHT JUST DIDN'T LAST AND YOU DIDN'T HAVE MONEY TO GET MORE.: NEVER TRUE

## 2024-11-26 SDOH — ECONOMIC STABILITY: HOUSING INSECURITY: AT ANY TIME IN THE PAST 12 MONTHS, WERE YOU HOMELESS OR LIVING IN A SHELTER (INCLUDING NOW)?: NO

## 2024-11-26 SDOH — SOCIAL STABILITY: SOCIAL INSECURITY: DO YOU FEEL ANYONE HAS EXPLOITED OR TAKEN ADVANTAGE OF YOU FINANCIALLY OR OF YOUR PERSONAL PROPERTY?: NO

## 2024-11-26 SDOH — SOCIAL STABILITY: SOCIAL INSECURITY: HAVE YOU HAD THOUGHTS OF HARMING ANYONE ELSE?: NO

## 2024-11-26 SDOH — SOCIAL STABILITY: SOCIAL INSECURITY: WERE YOU ABLE TO COMPLETE ALL THE BEHAVIORAL HEALTH SCREENINGS?: YES

## 2024-11-26 SDOH — SOCIAL STABILITY: SOCIAL INSECURITY
WITHIN THE LAST YEAR, HAVE YOU BEEN RAPED OR FORCED TO HAVE ANY KIND OF SEXUAL ACTIVITY BY YOUR PARTNER OR EX-PARTNER?: NO

## 2024-11-26 SDOH — ECONOMIC STABILITY: TRANSPORTATION INSECURITY: IN THE PAST 12 MONTHS, HAS LACK OF TRANSPORTATION KEPT YOU FROM MEDICAL APPOINTMENTS OR FROM GETTING MEDICATIONS?: NO

## 2024-11-26 SDOH — SOCIAL STABILITY: SOCIAL INSECURITY: ARE THERE ANY APPARENT SIGNS OF INJURIES/BEHAVIORS THAT COULD BE RELATED TO ABUSE/NEGLECT?: NO

## 2024-11-26 SDOH — SOCIAL STABILITY: SOCIAL INSECURITY
WITHIN THE LAST YEAR, HAVE YOU BEEN KICKED, HIT, SLAPPED, OR OTHERWISE PHYSICALLY HURT BY YOUR PARTNER OR EX-PARTNER?: NO

## 2024-11-26 SDOH — SOCIAL STABILITY: SOCIAL INSECURITY: DOES ANYONE TRY TO KEEP YOU FROM HAVING/CONTACTING OTHER FRIENDS OR DOING THINGS OUTSIDE YOUR HOME?: NO

## 2024-11-26 SDOH — SOCIAL STABILITY: SOCIAL INSECURITY: WITHIN THE LAST YEAR, HAVE YOU BEEN AFRAID OF YOUR PARTNER OR EX-PARTNER?: NO

## 2024-11-26 SDOH — SOCIAL STABILITY: SOCIAL INSECURITY: ABUSE: ADULT

## 2024-11-26 SDOH — ECONOMIC STABILITY: FOOD INSECURITY: HOW HARD IS IT FOR YOU TO PAY FOR THE VERY BASICS LIKE FOOD, HOUSING, MEDICAL CARE, AND HEATING?: NOT HARD AT ALL

## 2024-11-26 SDOH — SOCIAL STABILITY: SOCIAL INSECURITY: HAS ANYONE EVER THREATENED TO HURT YOUR FAMILY OR YOUR PETS?: NO

## 2024-11-26 ASSESSMENT — LIFESTYLE VARIABLES
NAUSEA AND VOMITING: NO NAUSEA AND NO VOMITING
HOW OFTEN DO YOU HAVE A DRINK CONTAINING ALCOHOL: 2-3 TIMES A WEEK
HEADACHE, FULLNESS IN HEAD: NOT PRESENT
AUDITORY DISTURBANCES: NOT PRESENT
ANXIETY: NO ANXIETY, AT EASE
ORIENTATION AND CLOUDING OF SENSORIUM: ORIENTED AND CAN DO SERIAL ADDITIONS
PAROXYSMAL SWEATS: NO SWEAT VISIBLE
PAROXYSMAL SWEATS: NO SWEAT VISIBLE
AGITATION: NORMAL ACTIVITY
HEADACHE, FULLNESS IN HEAD: NOT PRESENT
ANXIETY: NO ANXIETY, AT EASE
TOTAL SCORE: 0
HEADACHE, FULLNESS IN HEAD: NOT PRESENT
TREMOR: NO TREMOR
ORIENTATION AND CLOUDING OF SENSORIUM: ORIENTED AND CAN DO SERIAL ADDITIONS
ORIENTATION AND CLOUDING OF SENSORIUM: ORIENTED AND CAN DO SERIAL ADDITIONS
ANXIETY: NO ANXIETY, AT EASE
NAUSEA AND VOMITING: NO NAUSEA AND NO VOMITING
ANXIETY: NO ANXIETY, AT EASE
PAROXYSMAL SWEATS: NO SWEAT VISIBLE
NAUSEA AND VOMITING: NO NAUSEA AND NO VOMITING
AGITATION: NORMAL ACTIVITY
HEADACHE, FULLNESS IN HEAD: NOT PRESENT
PAROXYSMAL SWEATS: NO SWEAT VISIBLE
NAUSEA AND VOMITING: MILD NAUSEA WITH NO VOMITING
AGITATION: NORMAL ACTIVITY
HEADACHE, FULLNESS IN HEAD: NOT PRESENT
VISUAL DISTURBANCES: NOT PRESENT
TOTAL SCORE: 0
TREMOR: NO TREMOR
AUDITORY DISTURBANCES: NOT PRESENT
VISUAL DISTURBANCES: NOT PRESENT
TREMOR: NO TREMOR
SKIP TO QUESTIONS 9-10: 0
ANXIETY: NO ANXIETY, AT EASE
VISUAL DISTURBANCES: NOT PRESENT
ORIENTATION AND CLOUDING OF SENSORIUM: ORIENTED AND CAN DO SERIAL ADDITIONS
ORIENTATION AND CLOUDING OF SENSORIUM: ORIENTED AND CAN DO SERIAL ADDITIONS
TOTAL SCORE: 0
TREMOR: NO TREMOR
TREMOR: NO TREMOR
VISUAL DISTURBANCES: NOT PRESENT
AGITATION: NORMAL ACTIVITY
AUDIT-C TOTAL SCORE: 5
HOW MANY STANDARD DRINKS CONTAINING ALCOHOL DO YOU HAVE ON A TYPICAL DAY: 3 OR 4
NAUSEA AND VOMITING: NO NAUSEA AND NO VOMITING
ANXIETY: NO ANXIETY, AT EASE
AGITATION: NORMAL ACTIVITY
AUDITORY DISTURBANCES: NOT PRESENT
NAUSEA AND VOMITING: NO NAUSEA AND NO VOMITING
VISUAL DISTURBANCES: NOT PRESENT
TREMOR: NO TREMOR
HOW OFTEN DO YOU HAVE 6 OR MORE DRINKS ON ONE OCCASION: LESS THAN MONTHLY
ANXIETY: NO ANXIETY, AT EASE
TOTAL SCORE: 0
AGITATION: NORMAL ACTIVITY
TOTAL SCORE: 0
AUDIT-C TOTAL SCORE: 5
TREMOR: NO TREMOR
NAUSEA AND VOMITING: NO NAUSEA AND NO VOMITING
VISUAL DISTURBANCES: NOT PRESENT
TOTAL SCORE: 0
HEADACHE, FULLNESS IN HEAD: NOT PRESENT
NAUSEA AND VOMITING: NO NAUSEA AND NO VOMITING
SUBSTANCE_ABUSE_PAST_12_MONTHS: YES
HEADACHE, FULLNESS IN HEAD: NOT PRESENT
VISUAL DISTURBANCES: NOT PRESENT
PAROXYSMAL SWEATS: NO SWEAT VISIBLE
ANXIETY: NO ANXIETY, AT EASE
ORIENTATION AND CLOUDING OF SENSORIUM: ORIENTED AND CAN DO SERIAL ADDITIONS
AUDITORY DISTURBANCES: NOT PRESENT
AUDITORY DISTURBANCES: NOT PRESENT
HEADACHE, FULLNESS IN HEAD: NOT PRESENT
TREMOR: NO TREMOR
AUDITORY DISTURBANCES: NOT PRESENT
AGITATION: NORMAL ACTIVITY
VISUAL DISTURBANCES: NOT PRESENT
TREMOR: NO TREMOR
TOTAL SCORE: 0
ORIENTATION AND CLOUDING OF SENSORIUM: ORIENTED AND CAN DO SERIAL ADDITIONS
HEADACHE, FULLNESS IN HEAD: NOT PRESENT
HEADACHE, FULLNESS IN HEAD: NOT PRESENT
VISUAL DISTURBANCES: NOT PRESENT
AUDITORY DISTURBANCES: NOT PRESENT
PAROXYSMAL SWEATS: NO SWEAT VISIBLE
NAUSEA AND VOMITING: NO NAUSEA AND NO VOMITING
ORIENTATION AND CLOUDING OF SENSORIUM: ORIENTED AND CAN DO SERIAL ADDITIONS
TREMOR: NO TREMOR
AGITATION: NORMAL ACTIVITY
ORIENTATION AND CLOUDING OF SENSORIUM: ORIENTED AND CAN DO SERIAL ADDITIONS
PAROXYSMAL SWEATS: NO SWEAT VISIBLE
NAUSEA AND VOMITING: NO NAUSEA AND NO VOMITING
ANXIETY: MILDLY ANXIOUS
VISUAL DISTURBANCES: NOT PRESENT
AUDITORY DISTURBANCES: NOT PRESENT
TOTAL SCORE: 0
AUDITORY DISTURBANCES: NOT PRESENT
PAROXYSMAL SWEATS: NO SWEAT VISIBLE
TOTAL SCORE: 0
TOTAL SCORE: 2
AUDITORY DISTURBANCES: NOT PRESENT
AGITATION: NORMAL ACTIVITY
ANXIETY: NO ANXIETY, AT EASE
PAROXYSMAL SWEATS: NO SWEAT VISIBLE
PAROXYSMAL SWEATS: NO SWEAT VISIBLE
ORIENTATION AND CLOUDING OF SENSORIUM: ORIENTED AND CAN DO SERIAL ADDITIONS
AGITATION: NORMAL ACTIVITY

## 2024-11-26 ASSESSMENT — COGNITIVE AND FUNCTIONAL STATUS - GENERAL
MOBILITY SCORE: 24
MOBILITY SCORE: 24
PATIENT BASELINE BEDBOUND: NO
DAILY ACTIVITIY SCORE: 24
DAILY ACTIVITIY SCORE: 24
MOBILITY SCORE: 24
DAILY ACTIVITIY SCORE: 24

## 2024-11-26 ASSESSMENT — ACTIVITIES OF DAILY LIVING (ADL)
BATHING: INDEPENDENT
TOILETING: INDEPENDENT
HEARING - RIGHT EAR: FUNCTIONAL
HEARING - LEFT EAR: DIFFICULTY WITH NOISE
GROOMING: INDEPENDENT
JUDGMENT_ADEQUATE_SAFELY_COMPLETE_DAILY_ACTIVITIES: YES
FEEDING YOURSELF: INDEPENDENT
PATIENT'S MEMORY ADEQUATE TO SAFELY COMPLETE DAILY ACTIVITIES?: YES
LACK_OF_TRANSPORTATION: NO
DRESSING YOURSELF: INDEPENDENT
WALKS IN HOME: INDEPENDENT
LACK_OF_TRANSPORTATION: NO
ADEQUATE_TO_COMPLETE_ADL: YES

## 2024-11-26 ASSESSMENT — PATIENT HEALTH QUESTIONNAIRE - PHQ9
1. LITTLE INTEREST OR PLEASURE IN DOING THINGS: NOT AT ALL
2. FEELING DOWN, DEPRESSED OR HOPELESS: NOT AT ALL
SUM OF ALL RESPONSES TO PHQ9 QUESTIONS 1 & 2: 0

## 2024-11-26 ASSESSMENT — COLUMBIA-SUICIDE SEVERITY RATING SCALE - C-SSRS
6. HAVE YOU EVER DONE ANYTHING, STARTED TO DO ANYTHING, OR PREPARED TO DO ANYTHING TO END YOUR LIFE?: NO
2. HAVE YOU ACTUALLY HAD ANY THOUGHTS OF KILLING YOURSELF?: NO
1. IN THE PAST MONTH, HAVE YOU WISHED YOU WERE DEAD OR WISHED YOU COULD GO TO SLEEP AND NOT WAKE UP?: NO

## 2024-11-26 ASSESSMENT — PAIN - FUNCTIONAL ASSESSMENT: PAIN_FUNCTIONAL_ASSESSMENT: 0-10

## 2024-11-26 ASSESSMENT — PAIN DESCRIPTION - DESCRIPTORS: DESCRIPTORS: ACHING;PRESSURE;DISCOMFORT

## 2024-11-26 ASSESSMENT — PAIN SCALES - GENERAL
PAINLEVEL_OUTOF10: 7
PAINLEVEL_OUTOF10: 8

## 2024-11-26 NOTE — PROGRESS NOTES
Alexandre Poole is a 64 y.o. male on day 0 of admission presenting with Hematochezia.      Subjective     Mr. Poole has no abdominal pain, nausea this morning. Did have one bowel movement over night that was bright red, none while here in hospital.          Objective     Last Recorded Vitals  /67   Pulse 70   Temp 36.4 °C (97.5 °F)   Resp 18   Wt 88.1 kg (194 lb 3.6 oz)   SpO2 94%   Intake/Output last 3 Shifts:    Intake/Output Summary (Last 24 hours) at 11/26/2024 1415  Last data filed at 11/26/2024 1230  Gross per 24 hour   Intake 100 ml   Output --   Net 100 ml       Admission Weight  Weight: 88.1 kg (194 lb 3.6 oz) (11/26/24 0134)    Daily Weight  11/26/24 : 88.1 kg (194 lb 3.6 oz)    Image Results  ECG 12 lead  Normal sinus rhythm  Normal ECG  No previous ECGs available      Physical Exam    GEN: Awake, alert, NAD  HEENT: Head NCAT, MMM  CARDIO: Normal rate and rhythm  RESP: Lungs CTAB, normal WOB on RA  ABD: Soft, nt, nd  EXT: No edema of bilat LE  SKIN: Warm, dry, intact  NEURO: NFD, Aox4        Assessment/Plan      Alexandre Poole is a 64 y.o. male with PMH of HTN who is being admitted for further GI evaluation of hematochezia. Differential is broad including infectious volitis vs ischemic colitis, vs varices. Also cannot rule out diverticular bleed. Will workup for infectious causes and plan to scope him tomorrow.     #Bloody diarrhea  #C/f infectious colitis vs ischemic colitis  #C/f for gastric varices iso of chronic alcohol use  -Patient notes several nausea, abdominal pain, and diarrhea at 3 AM on 11/24  -First episode of diarrhea was normal, but subsequent episodes were bloody and bright red. (He has pictures on his phone)   -Notes he had double digit Bms that initial night  -Nothing like this has happened before  -Last episode of diarrhea at [place] this morning was bloody  -CT shows Left bernie colitis sparing the rectum c/f for infectious colitis or ischemic colitis  -WBC slightly  No elevated at 12.2  -Patient has LLQ pain but CT does not show pericolnic fat strandind; Less concern for diverticuitis  -Patient notes his mother had colon cancer diagnosed in her early 50s  -Patient reports last colonoscopy was in 2017 and was normal besides 1 polyp  -Patient also notes he drinks 4 drinks of gin a day  PLAN:  - Colonoscopy tomorrow  - Infectious colitis rule out with enteric stool panel, c diff, stool OP  - Follow Blood cultures  - Lactate level for possible ischemia  - Continue empiric CTX and metronidazole  - RUQ US to look for cirrhosis  - Although typically presents with melena, possible he has gastric varices from his chronic alcohol use that is presenting as bloody diarrhea  - Dilaudid 0.5 q4hr PRN for pain  - Zofran 4 mg IV q8hr for nausea     #Thrombocytopenia  -Possible differentials include liver dysfunction vs alcohol related bone marrow suppression vs vitamin k deficiency vs infection  PLAN:  -RUQ US as above  - Trend CBC  - Follow up smear     #Chronic alcohol use  -Patient notes he drinks 4 drinks of gin a day for many years (did not specify)  -Last drink was Saturday night before diarrhea Sunday morning  -Denies prior history of alcohol withdrawals  PLAN:  -CIWA protocol  -Thiamine 500 mg IV TID x3  -Folate 1 g daily     #HTN  -Continue home amlodipine 5 mg and lisinopril-hydrochlorothiazide 20-25 mg     F: PRN  E: PRN  N: CLD  A: PIV     DVT ppx: Hold iso of bloody diarrhea  GI ppx: IV PPI BID      Anders Vasquez MD

## 2024-11-26 NOTE — CARE PLAN
The patient's goals for the shift include      The clinical goals for the shift include Pts pain will be t or below 7/10 through shift    Over the shift, the patient did not make progress toward the following goals. Barriers to progression include colitis. Recommendations to address these barriers include pain medication as needed.

## 2024-11-26 NOTE — CONSULTS
[facility]   [facility] [place]  INITIAL CONSULT NOTE       Reason For Consult  Hematochezia     SUBJECTIVE     History Of Present Illness  Alexandre Poole is a 64 y.o. male with a past medical history of HTN, transferred from [facility] on 11/26/2024 w/ hematochezia.     GI is consulted for hematochezia.     Patient says that on Saturday he saw bright red blood in the toilet bowl. His stool was formed and brown. Since then he's been having ongoing bright red blood and clots with every BM. Last BM was ~24 hours ago.     Additionally, he reports having alternating loose and formed stools, which are well controlled by taking fiber supplement.  + chills and lower abdominal quadrants cramps.    Denied fever, sick contact, n/v, melena, and weight loss.    FH:  Mother - CRC in her 50s    SH:  Denied tobacco use. Drinks 4 cocktails/day +/- additional drinks. Denied recreational drug use. He takes x1 ibuprofen 2-3 days/week. He takes Bartelso's wort.     Review of Systems  12-point ROS has been reviewed and is negative, except if mentioned otherwise above.    Past Medical History:  History reviewed. No pertinent past medical history.    Home Medications  Medications Prior to Admission   Medication Sig Dispense Refill Last Dose/Taking    amLODIPine (Norvasc) 5 mg tablet TAKE 1 TABLET BY MOUTH EVERY DAY 90 tablet 1     fluticasone (Flonase) 50 mcg/actuation nasal spray Administer 1-2 sprays into each nostril once daily as needed for rhinitis or allergies. Shake gently. Before first use, prime pump. After use, clean tip and replace cap.       ibuprofen (MOTRIN ORAL) Take 800 mg by mouth 2 times a day as needed (pain).       Lactobacillus acidophilus (PROBIOTIC ORAL) Take 1 tablet by mouth once daily.       lisinopriL-hydrochlorothiazide 20-25 mg tablet TAKE 1 TABLET BY MOUTH EVERY DAY 90 tablet 0     NON FORMULARY Take 1 each by mouth 2 times a day. Z-Stack (Vitamin C 800 mg,  Vitamin D3 125 mcg, Zinc 30 mg, Quercetin 500 mg)       Katelin's wort 300 mg tablet Take 1 tablet by mouth once daily.          Surgical History:  History reviewed. No pertinent surgical history.    Allergies:  No Known Allergies    Social History:    Social History     Socioeconomic History    Marital status:      Spouse name: Not on file    Number of children: Not on file    Years of education: Not on file    Highest education level: Not on file   Occupational History    Not on file   Tobacco Use    Smoking status: Former     Types: Cigarettes    Smokeless tobacco: Never   Vaping Use    Vaping status: Never Used   Substance and Sexual Activity    Alcohol use: Yes     Alcohol/week: 4.0 standard drinks of alcohol     Types: 4 Standard drinks or equivalent per week    Drug use: Yes     Types: Marijuana    Sexual activity: Not on file   Other Topics Concern    Not on file   Social History Narrative    Not on file     Social Drivers of Health     Financial Resource Strain: Low Risk  (11/26/2024)    Overall Financial Resource Strain (CARDIA)     Difficulty of Paying Living Expenses: Not hard at all   Food Insecurity: No Food Insecurity (11/26/2024)    Hunger Vital Sign     Worried About Running Out of Food in the Last Year: Never true     Ran Out of Food in the Last Year: Never true   Transportation Needs: No Transportation Needs (11/26/2024)    PRAPARE - Transportation     Lack of Transportation (Medical): No     Lack of Transportation (Non-Medical): No   Physical Activity: Not on file   Stress: Not on file   Social Connections: Not on file   Intimate Partner Violence: Not At Risk (11/26/2024)    Humiliation, Afraid, Rape, and Kick questionnaire     Fear of Current or Ex-Partner: No     Emotionally Abused: No     Physically Abused: No     Sexually Abused: No   Housing Stability: Unknown (11/26/2024)    Housing Stability Vital Sign     Unable to Pay for Housing in the Last Year: No     Number of Times Moved in  the Last Year: Not on file     Homeless in the Last Year: No       Family History:  No family history on file.    EXAM     Vitals:    Vitals:    11/26/24 0227 11/26/24 0501 11/26/24 0950 11/26/24 1200   BP: 123/57 125/67 164/77 133/67   BP Location:       Patient Position:  Lying     Pulse:  61 66 70   Resp:  16 18 18   Temp:  37.1 °C (98.8 °F) 37.2 °C (99 °F) 36.4 °C (97.5 °F)   TempSrc:  Temporal     SpO2:  95% 94% 94%   Weight:       Height:         Failed to redirect to the Timeline version of the SureDone SmartLink.    Intake/Output Summary (Last 24 hours) at 11/26/2024 1322  Last data filed at 11/26/2024 1230  Gross per 24 hour   Intake 100 ml   Output --   Net 100 ml     Physical Exam   GENERAL: In no acute distress.  NEUROLOGIC: A and O x 3. Cranial nerves 2 through 12 grossly intact with no gait disturbances. Intact motor and sensory systems, with normal reflexes and coordination. No asterixis present.  HEENT: Pupils are equal, round, and reactive to light and accommodation. Extraocular motion intact. No scleral icterus.  CARDIAC: S1 + S2, RRR, no M/R/G.    LUNGS: CTA BL. No wheezes or coarse breath sounds. Symmetric respirations. No increased work of breathing.   ABDOMEN: Soft, non-tender to palpation. No rebound or guarding.  EXTREMITIES: Moving x4 equally and spontaneously with no joint warmth or swelling.    SKIN: Clean, warm, dry, and intact with normal skin turgor.  PSYCH: Appropriate mood and behavior.      OBJECTIVE                                                                              Medications       Current Facility-Administered Medications:     amLODIPine (Norvasc) tablet 5 mg, 5 mg, oral, Daily, Renny Reynolds MD, 5 mg at 11/26/24 0915    cefTRIAXone (Rocephin) 1 g in dextrose (iso) IV 50 mL, 1 g, intravenous, q24h, Renny Reynolds MD, Stopped at 11/26/24 0402    folic acid (Folvite) tablet 1 mg, 1 mg, oral, Daily, Renny Reynolds MD, 1 mg at 11/26/24 0915    HYDROmorphone (Dilaudid)  injection 0.5 mg, 0.5 mg, intravenous, q4h PRN, Renny Reynolds MD, 0.5 mg at 11/26/24 0917    lisinopril 10 mg, hydroCHLOROthiazide 12.5 mg for Zestoretic/Prinizide, , oral, Daily, Renny Reynolds MD, Given at 11/26/24 0914    metroNIDAZOLE (Flagyl) 500 mg in sodium chloride (iso)  mL, 500 mg, intravenous, q8h, Renny Reynolds MD, Stopped at 11/26/24 1230    multivitamin with minerals 1 tablet, 1 tablet, oral, Daily, Renny Reynolds MD, 1 tablet at 11/26/24 0915    ondansetron (Zofran) injection 4 mg, 4 mg, intravenous, q8h PRN, Renny Reynolds MD    [START ON 11/29/2024] thiamine (Vitamin B-1) tablet 100 mg, 100 mg, oral, Daily, Renny Reynolds MD    thiamine (Vitamin B1) injection 100 mg, 100 mg, intravenous, Daily, Renny Reynolds MD, 100 mg at 11/26/24 0915                                                                            Labs     Results for orders placed or performed during the hospital encounter of 11/26/24 (from the past 24 hours)   CBC and Auto Differential   Result Value Ref Range    WBC 11.9 (H) 4.4 - 11.3 x10*3/uL    nRBC 0.0 0.0 - 0.0 /100 WBCs    RBC 5.21 4.50 - 5.90 x10*6/uL    Hemoglobin 15.8 13.5 - 17.5 g/dL    Hematocrit 46.0 41.0 - 52.0 %    MCV 88 80 - 100 fL    MCH 30.3 26.0 - 34.0 pg    MCHC 34.3 32.0 - 36.0 g/dL    RDW 13.1 11.5 - 14.5 %    Platelets 93 (L) 150 - 450 x10*3/uL    Neutrophils % 75.6 40.0 - 80.0 %    Immature Granulocytes %, Automated 0.3 0.0 - 0.9 %    Lymphocytes % 12.5 13.0 - 44.0 %    Monocytes % 11.0 2.0 - 10.0 %    Eosinophils % 0.3 0.0 - 6.0 %    Basophils % 0.3 0.0 - 2.0 %    Neutrophils Absolute 8.97 (H) 1.20 - 7.70 x10*3/uL    Immature Granulocytes Absolute, Automated 0.04 0.00 - 0.70 x10*3/uL    Lymphocytes Absolute 1.48 1.20 - 4.80 x10*3/uL    Monocytes Absolute 1.31 (H) 0.10 - 1.00 x10*3/uL    Eosinophils Absolute 0.04 0.00 - 0.70 x10*3/uL    Basophils Absolute 0.04 0.00 - 0.10 x10*3/uL   Magnesium   Result Value Ref Range    Magnesium 2.13  1.60 - 2.40 mg/dL   Hepatic function panel   Result Value Ref Range    Albumin 4.4 3.4 - 5.0 g/dL    Bilirubin, Total 1.0 0.0 - 1.2 mg/dL    Bilirubin, Direct 0.2 0.0 - 0.3 mg/dL    Alkaline Phosphatase 40 33 - 136 U/L    ALT 43 10 - 52 U/L    AST 43 (H) 9 - 39 U/L    Total Protein 7.5 6.4 - 8.2 g/dL   Phosphorus   Result Value Ref Range    Phosphorus 3.0 2.5 - 4.9 mg/dL   Basic Metabolic Panel   Result Value Ref Range    Glucose 89 74 - 99 mg/dL    Sodium 141 136 - 145 mmol/L    Potassium 3.4 (L) 3.5 - 5.3 mmol/L    Chloride 101 98 - 107 mmol/L    Bicarbonate 31 21 - 32 mmol/L    Anion Gap 12 10 - 20 mmol/L    Urea Nitrogen 16 6 - 23 mg/dL    Creatinine 0.77 0.50 - 1.30 mg/dL    eGFR >90 >60 mL/min/1.73m*2    Calcium 9.0 8.6 - 10.6 mg/dL   Blood Culture    Specimen: Peripheral Venipuncture; Blood culture   Result Value Ref Range    Blood Culture Loaded on Instrument - Culture in progress    Lactate   Result Value Ref Range    Lactate 0.9 0.4 - 2.0 mmol/L   Coagulation Screen   Result Value Ref Range    Protime 12.3 9.8 - 12.8 seconds    INR 1.1 0.9 - 1.1    aPTT 31 27 - 38 seconds   Vitamin B12   Result Value Ref Range    Vitamin B12 398 211 - 911 pg/mL   Folate   Result Value Ref Range    Folate, Serum 12.6 >5.0 ng/mL   Lipid panel   Result Value Ref Range    Cholesterol 149 0 - 199 mg/dL    HDL-Cholesterol 60.5 mg/dL    Cholesterol/HDL Ratio 2.5     LDL Calculated 67 <=99 mg/dL    VLDL 22 0 - 40 mg/dL    Triglycerides 108 0 - 149 mg/dL    Non HDL Cholesterol 89 0 - 149 mg/dL   Hepatitis panel, acute   Result Value Ref Range    Hepatitis B Surface AG Nonreactive Nonreactive    Hepatitis A  AB- IgM Nonreactive Nonreactive    Hepatitis B Core AB; IgM Nonreactive Nonreactive    Hepatitis C AB Nonreactive Nonreactive   Ethanol   Result Value Ref Range    Alcohol <10 <=10 mg/dL   Blood Culture    Specimen: Peripheral Venipuncture; Blood culture   Result Value Ref Range    Blood Culture Loaded on Instrument - Culture in  progress                                                                               Imaging           11/24/2024 CT A/P w/ and w/o IV contrast:  IMPRESSION:  1.  Left bernie colitis sparing the rectum as detailed above.  Infectious colitis or possibly colitis due to ischemia could have  this appearance.  2. Enlarged prostate with probable thickening of the bladder wall  though the bladder is not well distended.                                                                         GI Procedures          ASSESSMENT / PLAN                  ASSESSMENT/PLAN:    Alexandre Poole is a 64 y.o. male with a past medical history of HTN, transferred from [facility] on 11/26/2024 w/ hematochezia.     GI is consulted for hematochezia.     The patient is HDS and hgb is normal. His presentation might be in the setting of ischemic colitis vs. IBD vs. hemorrhoidal bleeding. On the differential, diverticular bleed, although less likely. To evaluate for stated etiologies we will proceed with a colonoscopy tomorrow.     Additionally, he was found to have thrombocytopenia w/ cross=sectional imaging suggestive of cirrhosis. Based on the patient's SH this is most likely alcohol associated. To evaluate for other possible etiologies causing chronic liver disease plan as below.    Recommendations:  #hematochezia  #cross-sectional imaging showing L-sided colitis  - Please start bowel preparation per Bowel Prep order set for tentative colonoscopy tomorrow.    #thrombocytopenia  #cross-sectional imaging suggestive of cirrhosis  - To complete the serologic work-up to evaluate for any viral, autoimmune, or metabolic causes of liver disease, and to evaluate for immunity to hepatitis A and B, please send off the following lab tests today:     Antinuclear antibody (SHASHI)  Antimitochondrial antibody (AMA)  Smooth muscle antibody (SMA)  Ceruloplasmin  Iron panel  Ferritin  Alpha-1-antitrypsin phenotype  Hepatitis A total antibody  Hepatitis B surface  antigen  Hepatitis B surface antibody  Hepatitis C antibody  Alpha-fetoprotein (AFP)  IgG level  PEth    - Please acquire RUQ US w/ doppler.    The above recommendations were communicated to the [facility] team.    Patient was seen and  discussed with Dr. Mayer.    Gastroenterology will continue to follow.  During weekday hours of 7am-5pm please do not hesitate to contact me on Take the Interview Chat or page [phone], if there are any further questions between the weekday hours of 7am-5pm.   After hours, on weekends, and on holidays, please page the [place] [phone]. Thank you.    Suzanne Bragg MD  PGY-5 Gastroenterology and Hepatology Fellow  Dig[facility]

## 2024-11-26 NOTE — H&P
History Of Present Illness  Alexandre Poole is a 64 y.o. male with PMH of HTN who originally presented to [facility] ED on 11/24 for nausea, abdominal pain, and bloody diarrhea. He notes he woke up Sunday 11/24 at 3 AM with nausea, abdominal pain, and diarrhea. His first episode of diarrhea was non bloody. All other subsequent episodes were bloody. He states that morning he had double digit bowel movements. He denies anything like this happening before. Denies hemoptysis, hematemesis. Denies history of blood clot. He notes his mother had colon cancer in her early 50s. He states he last had a colonoscopy in 2017 which was normal besides 1 polyp. He denies any recent travel or outdoor activities. He did note he woke up drenched in sweat one night the week prior. Denies fevers, chills, vomiting, chest pain, sob, or change in urination. On social history he notes he has 4 drinks of gin daily and has done this for many years. He denies ever having alcohol withdrawal. Last drink was Saturday night 11/23. HE denies smoking cigarettes but notes he smokes marijuana daily.    He was transferred to [facility] for further GI evaluation    [facility] ED course:  Vitals:  T: 97.5, HR: 76, BP: 174/95, O2: 99% on RA    Labs:  CBC: WBC: 12.6, Hgb: 15.0, plt: 90  RFP: Na: 140, K: 3.8, Cl: 101, bicarb: 29, BUN: 18, Cr: 0.74, Glu: 130  LFT: AST: 41, ALT: 66, alk phos: 53, T. Bili: 0.6  Lactate: 1.7  UA: bland    Imaging:    CT AP   IMPRESSION:  1.  Left bernie colitis sparing the rectum as detailed above.  Infectious colitis or possibly colitis due to ischemia could have  this appearance.  2. Enlarged prostate with probable thickening of the bladder wall  though the bladder is not well distended.    ED interventions:  Dilaudid IV, bentyl, PPI    Past Medical History  He has no past medical history on file.    Surgical History  He has no past surgical history on file.     Social History  He reports that he has quit smoking. His smoking use included  cigarettes. He has never used smokeless tobacco. He reports current alcohol use of about 4.0 standard drinks of alcohol per week. He reports current drug use. Drug: Marijuana.    Family History  No family history on file.     Allergies  Patient has no known allergies.    Review of Systems   Per HPI    Physical Exam     GEN: Awake, alert, NAD  HEENT: Head NCAT, MMM  CARDIO: Normal rate and rhythm  RESP: Lungs CTAB, normal WOB on RA  ABD: Soft, nt, nd  EXT: No edema of bilat LE  SKIN: Warm, dry, intact  NEURO: NFD, Aox4      Last Recorded Vitals  /84 (BP Location: Right arm, Patient Position: Sitting)   Pulse 65   Temp 37 °C (98.6 °F) (Temporal)   Resp 14   Wt 88.1 kg (194 lb 3.6 oz)   SpO2 96%     Relevant Results  Scheduled medications  amLODIPine, 5 mg, oral, Daily  cefTRIAXone, 1 g, intravenous, q24h  folic acid, 1 mg, oral, Daily  lisinopril 10 mg, hydroCHLOROthiazide 12.5 mg for Zestoretic/Prinizide, , oral, Daily  metroNIDAZOLE, 500 mg, intravenous, q8h  multivitamin with minerals, 1 tablet, oral, Daily  pantoprazole, 40 mg, intravenous, BID  [START ON 11/29/2024] thiamine, 100 mg, oral, Daily  thiamine, 100 mg, intravenous, Daily      Continuous medications     PRN medications      Assessment/Plan   Assessment & Plan      Alexandre Poole is a 64 y.o. male with PMH of HTN who is being admitted for further GI evaluation of hematochezia. Differential is broad including infectious volitis vs ischemic colitis, vs gastric varices. Will collect stool studies, start on abx, monitor lactate for ischemia and obtain RUQ US to evaluate for cirrhosis. Consider EGD/cscope    #Bloody diarrhea  #C/f infectious colitis vs ischemic colitis  #C/f for gastric varices iso of chronic alcohol use  -Patient notes several nausea, abdominal pain, and diarrhea at 3 AM on 11/24  -First episode of diarrhea was normal, but subsequent episodes were bloody and bright red. (He has pictures on his phone)   -Notes he had double  digit Bms that initial night  -Nothing like this has happened before  -Last episode of diarrhea at [facility] this morning was bloody  -CT shows Left bernie colitis sparing the rectum c/f for infectious colitis or ischemic colitis  -WBC slightly elevated at 12.2  -Patient has LLQ pain but CT does not show pericolnic fat strandind; Less concern for diverticuitis  -Patient notes his mother had colon cancer diagnosed in her early 50s  -Patient reports last colonoscopy was in 2017 and was normal besides 1 polyp  -Patient also notes he drinks 4 drinks of gin a day  PLAN:  -Infectious colitis rule out with enteric stool panel, c diff, stool OP  -Blood cultures  -Lactate level for possible ischemia  -Will start empiric CTX and metronidazole  -RUQ US to look for cirrhosis  -Although typically presents with melena, possible he has gastric varices from his chronic alcohol use that is presenting as bloody diarrhea  -Will order coags  -Consideration of EGD/cscope  -IV PPI BID  -Dilaudid 0.5 q4hr PRN for pain  -Zofran 4 mg IV q8hr for nausea    #Thrombocytopenia  -Plt count at [facility]: 107->101->90  -Possible differentials include liver dysfunction vs alcohol related bone marrow suppression vs vitamin k deficiency vs infection  PLAN:  -Workup with blood smear, coags, LFTs, b12, folate  -RUQ US as above    #Chronic alcohol use  -Patient notes he drinks 4 drinks of gin a day for many years (did not specify)  -Last drink was Saturday night before diarrhea Sunday morning  -Denies prior history of alcohol withdrawals  PLAN:  -[facility] protocol  -Thiamine 500 mg IV TID x3  -Folate 1 g daily    #HTN  -Continue home amlodipine 5 mg and lisinopril-hydrochlorothiazide 20-25 mg    F: PRN  E: PRN  N: CLD  A: PIV    DVT ppx: Hold iso of bloody diarrhea  GI ppx: IV PPI BID      Renny Reynolds MD

## 2024-11-26 NOTE — PROGRESS NOTES
11/26/24 0848   Discharge Planning   Living Arrangements Spouse/significant other   Support Systems Spouse/significant other;Children   Assistance Needed None  (Pt states he injured his back doing yard work, which was diagnosed as herniated disk. He uses outpt PT at [facility], but states improvement. He states he is currently independant with all ADLs.)   Type of Residence Private residence   Number of Stairs to Enter Residence 4   Number of Stairs Within Residence   (1 fight of stairs leading to the basement)   Do you have animals or pets at home? No   Home or Post Acute Services Other (Comment)  (Pt to continue with Outpt PT at post DC at [facility])   Expected Discharge Disposition Home  (Pt to continue with Outpt PT  post DC at [facility])   Does the patient need discharge transport arranged? No   RoundTrip coordination needed? No   Financial Resource Strain   How hard is it for you to pay for the very basics like food, housing, medical care, and heating? Not hard   Housing Stability   In the last 12 months, was there a time when you were not able to pay the mortgage or rent on time? N   At any time in the past 12 months, were you homeless or living in a shelter (including now)? N   Transportation Needs   In the past 12 months, has lack of transportation kept you from medical appointments or from getting medications? no   In the past 12 months, has lack of transportation kept you from meetings, work, or from getting things needed for daily living? No     PCP:  Rick Peace  DATE OF LAST VISIT: January 2024  CAN PT MAKE OWN FOLLOW UP APPOINMENT AFTER DISCHARGE: Yes  PHARMACY:  [facility]  MEDICATIONS AFFORDABLE: Yes  FEELS SAFE AT HOME: Yes  RECENT FALLS: None  EQUIPMENT USED IN HOME: None  HOME O2/CPAP/NEBS: None  DME SUPPLIER:  N/A  TRANSPORT HOME: Pt states significant other Harika Mcduffie will give him transport  home.  DIABETIC/SUPPLIES NEEDED: N/A  HD SCHEDULE: N/A  PREVIOUS HOME CARE: None      This TCC met with the patient and introduced myself as a TCC and member of the Care Transitions team for discharge planning. Pt lives in a private residence with significant other The patient stated they feel safe at home, and stated they were independent prior to admission. However pt did have a herniated disk which he is receiving outpt Physical therapy. Pt states states injury occurred while he was gardening. Pt states he does not currently use assistive devices and is independent with care. He does state being slower with getting in and out of the bathtub during bathing. Pt denies any PT/OT consult at this time. Pt states he thinks he might drink too much. He states he uses Marijuana  but does not think he needs help with uses of marijuana. Pt is agreeable to [facility] consult for Alcohol and Marijuana use. [facility] consulted and anticipated to meet with pt at bedside today. The patient drives to their own doctor appointment. The patient's address, phone number and contact information was verified and updated. The patient does not have any other questions/concerns at this time. This TCC will continue to follow for discharge needs.    Trina COLUGNA, TCC  [phone]  [email or web address]

## 2024-11-27 ENCOUNTER — APPOINTMENT (OUTPATIENT)
Dept: RADIOLOGY | Facility: HOSPITAL | Age: 64
DRG: 386 | End: 2024-11-27
Payer: COMMERCIAL

## 2024-11-27 ENCOUNTER — APPOINTMENT (OUTPATIENT)
Dept: GASTROENTEROLOGY | Facility: HOSPITAL | Age: 64
DRG: 386 | End: 2024-11-27
Payer: COMMERCIAL

## 2024-11-27 LAB
ABO GROUP (TYPE) IN BLOOD: NORMAL
ALBUMIN SERPL BCP-MCNC: 4 G/DL (ref 3.4–5)
ALP SERPL-CCNC: 52 U/L (ref 33–136)
ALT SERPL W P-5'-P-CCNC: 40 U/L (ref 10–52)
ANA SER QL HEP2 SUBST: NEGATIVE
ANION GAP SERPL CALC-SCNC: 13 MMOL/L (ref 10–20)
ANTIBODY SCREEN: NORMAL
AST SERPL W P-5'-P-CCNC: 48 U/L (ref 9–39)
BASOPHILS # BLD AUTO: 0.04 X10*3/UL (ref 0–0.1)
BASOPHILS NFR BLD AUTO: 0.3 %
BILIRUB DIRECT SERPL-MCNC: 0.4 MG/DL (ref 0–0.3)
BILIRUB SERPL-MCNC: 1.2 MG/DL (ref 0–1.2)
BUN SERPL-MCNC: 9 MG/DL (ref 6–23)
C COLI+JEJ+UPSA DNA STL QL NAA+PROBE: NOT DETECTED
CALCIUM SERPL-MCNC: 8.7 MG/DL (ref 8.6–10.6)
CHLORIDE SERPL-SCNC: 99 MMOL/L (ref 98–107)
CO2 SERPL-SCNC: 30 MMOL/L (ref 21–32)
CREAT SERPL-MCNC: 0.74 MG/DL (ref 0.5–1.3)
EC STX1 GENE STL QL NAA+PROBE: NOT DETECTED
EC STX2 GENE STL QL NAA+PROBE: NOT DETECTED
EGFRCR SERPLBLD CKD-EPI 2021: >90 ML/MIN/1.73M*2
EOSINOPHIL # BLD AUTO: 0.02 X10*3/UL (ref 0–0.7)
EOSINOPHIL NFR BLD AUTO: 0.2 %
ERYTHROCYTE [DISTWIDTH] IN BLOOD BY AUTOMATED COUNT: 12.6 % (ref 11.5–14.5)
GLUCOSE SERPL-MCNC: 93 MG/DL (ref 74–99)
HCT VFR BLD AUTO: 44.6 % (ref 41–52)
HGB BLD-MCNC: 15.2 G/DL (ref 13.5–17.5)
IMM GRANULOCYTES # BLD AUTO: 0.07 X10*3/UL (ref 0–0.7)
IMM GRANULOCYTES NFR BLD AUTO: 0.6 % (ref 0–0.9)
LYMPHOCYTES # BLD AUTO: 1.08 X10*3/UL (ref 1.2–4.8)
LYMPHOCYTES NFR BLD AUTO: 9.2 %
MAGNESIUM SERPL-MCNC: 2.03 MG/DL (ref 1.6–2.4)
MCH RBC QN AUTO: 29.6 PG (ref 26–34)
MCHC RBC AUTO-ENTMCNC: 34.1 G/DL (ref 32–36)
MCV RBC AUTO: 87 FL (ref 80–100)
MONOCYTES # BLD AUTO: 1.21 X10*3/UL (ref 0.1–1)
MONOCYTES NFR BLD AUTO: 10.3 %
NEUTROPHILS # BLD AUTO: 9.38 X10*3/UL (ref 1.2–7.7)
NEUTROPHILS NFR BLD AUTO: 79.4 %
NOROVIRUS GI + GII RNA STL NAA+PROBE: NOT DETECTED
NRBC BLD-RTO: 0 /100 WBCS (ref 0–0)
PHOSPHATE SERPL-MCNC: 3.4 MG/DL (ref 2.5–4.9)
PLATELET # BLD AUTO: 104 X10*3/UL (ref 150–450)
POTASSIUM SERPL-SCNC: 3.4 MMOL/L (ref 3.5–5.3)
PROT SERPL-MCNC: 6.9 G/DL (ref 6.4–8.2)
RBC # BLD AUTO: 5.13 X10*6/UL (ref 4.5–5.9)
RH FACTOR (ANTIGEN D): NORMAL
RV RNA STL NAA+PROBE: NOT DETECTED
SALMONELLA DNA STL QL NAA+PROBE: NOT DETECTED
SHIGELLA DNA SPEC QL NAA+PROBE: NOT DETECTED
SODIUM SERPL-SCNC: 139 MMOL/L (ref 136–145)
V CHOLERAE DNA STL QL NAA+PROBE: NOT DETECTED
WBC # BLD AUTO: 11.8 X10*3/UL (ref 4.4–11.3)
Y ENTEROCOL DNA STL QL NAA+PROBE: NOT DETECTED

## 2024-11-27 PROCEDURE — 2500000004 HC RX 250 GENERAL PHARMACY W/ HCPCS (ALT 636 FOR OP/ED)

## 2024-11-27 PROCEDURE — 88305 TISSUE EXAM BY PATHOLOGIST: CPT | Mod: TC,SUR | Performed by: INTERNAL MEDICINE

## 2024-11-27 PROCEDURE — 1100000001 HC PRIVATE ROOM DAILY

## 2024-11-27 PROCEDURE — 7100000009 HC PHASE TWO TIME - INITIAL BASE CHARGE

## 2024-11-27 PROCEDURE — 88305 TISSUE EXAM BY PATHOLOGIST: CPT | Performed by: PATHOLOGY

## 2024-11-27 PROCEDURE — 80321 ALCOHOLS BIOMARKERS 1OR 2: CPT

## 2024-11-27 PROCEDURE — 45385 COLONOSCOPY W/LESION REMOVAL: CPT | Mod: 52 | Performed by: INTERNAL MEDICINE

## 2024-11-27 PROCEDURE — 2500000004 HC RX 250 GENERAL PHARMACY W/ HCPCS (ALT 636 FOR OP/ED): Mod: JZ

## 2024-11-27 PROCEDURE — 2500000002 HC RX 250 W HCPCS SELF ADMINISTERED DRUGS (ALT 637 FOR MEDICARE OP, ALT 636 FOR OP/ED)

## 2024-11-27 PROCEDURE — 0DBP8ZZ EXCISION OF RECTUM, VIA NATURAL OR ARTIFICIAL OPENING ENDOSCOPIC: ICD-10-PCS | Performed by: INTERNAL MEDICINE

## 2024-11-27 PROCEDURE — 82565 ASSAY OF CREATININE: CPT

## 2024-11-27 PROCEDURE — 86901 BLOOD TYPING SEROLOGIC RH(D): CPT

## 2024-11-27 PROCEDURE — 0DBM8ZX EXCISION OF DESCENDING COLON, VIA NATURAL OR ARTIFICIAL OPENING ENDOSCOPIC, DIAGNOSTIC: ICD-10-PCS | Performed by: INTERNAL MEDICINE

## 2024-11-27 PROCEDURE — 0DBN8ZZ EXCISION OF SIGMOID COLON, VIA NATURAL OR ARTIFICIAL OPENING ENDOSCOPIC: ICD-10-PCS | Performed by: INTERNAL MEDICINE

## 2024-11-27 PROCEDURE — 80076 HEPATIC FUNCTION PANEL: CPT

## 2024-11-27 PROCEDURE — 0DBP8ZX EXCISION OF RECTUM, VIA NATURAL OR ARTIFICIAL OPENING ENDOSCOPIC, DIAGNOSTIC: ICD-10-PCS | Performed by: INTERNAL MEDICINE

## 2024-11-27 PROCEDURE — 76705 ECHO EXAM OF ABDOMEN: CPT | Performed by: RADIOLOGY

## 2024-11-27 PROCEDURE — 2500000004 HC RX 250 GENERAL PHARMACY W/ HCPCS (ALT 636 FOR OP/ED): Performed by: INTERNAL MEDICINE

## 2024-11-27 PROCEDURE — 36415 COLL VENOUS BLD VENIPUNCTURE: CPT

## 2024-11-27 PROCEDURE — 93975 VASCULAR STUDY: CPT | Performed by: RADIOLOGY

## 2024-11-27 PROCEDURE — 99152 MOD SED SAME PHYS/QHP 5/>YRS: CPT | Performed by: INTERNAL MEDICINE

## 2024-11-27 PROCEDURE — 93975 VASCULAR STUDY: CPT

## 2024-11-27 PROCEDURE — 3700000012 HC SEDATION LEVEL 5+ TIME - INITIAL 15 MINUTES 5/> YEARS

## 2024-11-27 PROCEDURE — 85025 COMPLETE CBC W/AUTO DIFF WBC: CPT

## 2024-11-27 PROCEDURE — 99233 SBSQ HOSP IP/OBS HIGH 50: CPT

## 2024-11-27 PROCEDURE — 2500000001 HC RX 250 WO HCPCS SELF ADMINISTERED DRUGS (ALT 637 FOR MEDICARE OP)

## 2024-11-27 PROCEDURE — 84100 ASSAY OF PHOSPHORUS: CPT

## 2024-11-27 PROCEDURE — 83735 ASSAY OF MAGNESIUM: CPT

## 2024-11-27 PROCEDURE — 7100000010 HC PHASE TWO TIME - EACH INCREMENTAL 1 MINUTE

## 2024-11-27 RX ORDER — POTASSIUM CHLORIDE 20 MEQ/1
40 TABLET, EXTENDED RELEASE ORAL ONCE
Status: COMPLETED | OUTPATIENT
Start: 2024-11-27 | End: 2024-11-27

## 2024-11-27 RX ORDER — MIDAZOLAM HYDROCHLORIDE 1 MG/ML
INJECTION, SOLUTION INTRAMUSCULAR; INTRAVENOUS AS NEEDED
Status: DISCONTINUED | OUTPATIENT
Start: 2024-11-27 | End: 2024-11-27 | Stop reason: HOSPADM

## 2024-11-27 RX ORDER — FENTANYL CITRATE 50 UG/ML
INJECTION, SOLUTION INTRAMUSCULAR; INTRAVENOUS AS NEEDED
Status: DISCONTINUED | OUTPATIENT
Start: 2024-11-27 | End: 2024-11-27 | Stop reason: HOSPADM

## 2024-11-27 RX ADMIN — MIDAZOLAM 2 MG: 1 INJECTION INTRAMUSCULAR; INTRAVENOUS at 14:24

## 2024-11-27 RX ADMIN — METRONIDAZOLE 500 MG: 500 INJECTION, SOLUTION INTRAVENOUS at 10:07

## 2024-11-27 RX ADMIN — FENTANYL CITRATE 25 MCG: 50 INJECTION, SOLUTION INTRAMUSCULAR; INTRAVENOUS at 14:26

## 2024-11-27 RX ADMIN — HYDROMORPHONE HYDROCHLORIDE 0.5 MG: 1 INJECTION, SOLUTION INTRAMUSCULAR; INTRAVENOUS; SUBCUTANEOUS at 08:12

## 2024-11-27 RX ADMIN — FOLIC ACID 1 MG: 1 TABLET ORAL at 08:13

## 2024-11-27 RX ADMIN — POTASSIUM CHLORIDE 40 MEQ: 1500 TABLET, EXTENDED RELEASE ORAL at 18:27

## 2024-11-27 RX ADMIN — THIAMINE HYDROCHLORIDE 100 MG: 100 INJECTION, SOLUTION INTRAMUSCULAR; INTRAVENOUS at 08:12

## 2024-11-27 RX ADMIN — MIDAZOLAM 1 MG: 1 INJECTION INTRAMUSCULAR; INTRAVENOUS at 14:26

## 2024-11-27 RX ADMIN — ONDANSETRON 4 MG: 2 INJECTION INTRAMUSCULAR; INTRAVENOUS at 03:17

## 2024-11-27 RX ADMIN — FENTANYL CITRATE 50 MCG: 50 INJECTION, SOLUTION INTRAMUSCULAR; INTRAVENOUS at 14:24

## 2024-11-27 RX ADMIN — METRONIDAZOLE 500 MG: 500 INJECTION, SOLUTION INTRAVENOUS at 02:08

## 2024-11-27 RX ADMIN — METRONIDAZOLE 500 MG: 500 INJECTION, SOLUTION INTRAVENOUS at 18:26

## 2024-11-27 RX ADMIN — ONDANSETRON 4 MG: 2 INJECTION INTRAMUSCULAR; INTRAVENOUS at 08:12

## 2024-11-27 RX ADMIN — Medication 1 TABLET: at 08:13

## 2024-11-27 RX ADMIN — CEFTRIAXONE SODIUM 1 G: 1 INJECTION, SOLUTION INTRAVENOUS at 02:08

## 2024-11-27 RX ADMIN — HYDROCHLOROTHIAZIDE: 25 TABLET ORAL at 08:13

## 2024-11-27 RX ADMIN — AMLODIPINE BESYLATE 5 MG: 5 TABLET ORAL at 08:13

## 2024-11-27 ASSESSMENT — PAIN - FUNCTIONAL ASSESSMENT
PAIN_FUNCTIONAL_ASSESSMENT: 0-10

## 2024-11-27 ASSESSMENT — PAIN SCALES - GENERAL
PAINLEVEL_OUTOF10: 0 - NO PAIN
PAINLEVEL_OUTOF10: 7

## 2024-11-27 ASSESSMENT — LIFESTYLE VARIABLES
TREMOR: NO TREMOR
AUDITORY DISTURBANCES: NOT PRESENT
ANXIETY: MILDLY ANXIOUS
PAROXYSMAL SWEATS: NO SWEAT VISIBLE
TREMOR: NO TREMOR
HEADACHE, FULLNESS IN HEAD: NOT PRESENT
ORIENTATION AND CLOUDING OF SENSORIUM: ORIENTED AND CAN DO SERIAL ADDITIONS
TOTAL SCORE: 2
ORIENTATION AND CLOUDING OF SENSORIUM: ORIENTED AND CAN DO SERIAL ADDITIONS
ORIENTATION AND CLOUDING OF SENSORIUM: ORIENTED AND CAN DO SERIAL ADDITIONS
AUDITORY DISTURBANCES: NOT PRESENT
TOTAL SCORE: 0
TOTAL SCORE: 1
PAROXYSMAL SWEATS: NO SWEAT VISIBLE
AUDITORY DISTURBANCES: NOT PRESENT
HEADACHE, FULLNESS IN HEAD: NOT PRESENT
HEADACHE, FULLNESS IN HEAD: NOT PRESENT
NAUSEA AND VOMITING: NO NAUSEA AND NO VOMITING
ANXIETY: NO ANXIETY, AT EASE
ANXIETY: NO ANXIETY, AT EASE
VISUAL DISTURBANCES: NOT PRESENT
AGITATION: NORMAL ACTIVITY
NAUSEA AND VOMITING: MILD NAUSEA WITH NO VOMITING
PAROXYSMAL SWEATS: NO SWEAT VISIBLE
VISUAL DISTURBANCES: NOT PRESENT
VISUAL DISTURBANCES: NOT PRESENT
NAUSEA AND VOMITING: MILD NAUSEA WITH NO VOMITING
AGITATION: NORMAL ACTIVITY
AGITATION: NORMAL ACTIVITY
TREMOR: NO TREMOR

## 2024-11-27 ASSESSMENT — COGNITIVE AND FUNCTIONAL STATUS - GENERAL
DAILY ACTIVITIY SCORE: 24
MOBILITY SCORE: 24

## 2024-11-27 ASSESSMENT — ENCOUNTER SYMPTOMS
NAUSEA: 0
ABDOMINAL PAIN: 1
VOMITING: 0
ANAL BLEEDING: 0
BLOOD IN STOOL: 0

## 2024-11-27 NOTE — H&P
"History Of Present Illness  Alexandre Poole is a 64 y.o. male presenting for a colonoscopy due to hematochezia and cross-sectional imaging showing L-sided colonic wall thickening suggestive of colitis.     Past Medical History  Past Medical History:   Diagnosis Date    Hypertension      Surgical History  History reviewed. No pertinent surgical history.  Social History  He reports that he has quit smoking. His smoking use included cigarettes. He has never used smokeless tobacco. He reports current alcohol use of about 4.0 standard drinks of alcohol per week. He reports current drug use. Drug: Marijuana.    Family History  No family history on file.     Allergies  No Known Allergies  Review of Systems   Gastrointestinal:  Positive for abdominal pain. Negative for anal bleeding, blood in stool, nausea and vomiting.     Pre-sedation Evaluation:  ASA Classification - ASA 3 - Patient with moderate systemic disease with functional limitations  Mallampati Score - III (soft and hard palate and base of uvula visible)    Physical Exam  Vitals reviewed.   Constitutional:       Appearance: Normal appearance.   Eyes:      Pupils: Pupils are equal, round, and reactive to light.   Cardiovascular:      Rate and Rhythm: Normal rate.      Heart sounds: Normal heart sounds.   Pulmonary:      Breath sounds: Normal breath sounds.   Abdominal:      Palpations: Abdomen is soft.      Tenderness: There is abdominal tenderness. There is no guarding or rebound.   Neurological:      General: No focal deficit present.      Mental Status: He is alert and oriented to person, place, and time.   Psychiatric:         Mood and Affect: Mood normal.         Behavior: Behavior normal.       Last Recorded Vitals  Blood pressure 158/88, pulse 80, temperature 37.2 °C (99 °F), temperature source Temporal, resp. rate 18, height 1.753 m (5' 9\"), weight 88.1 kg (194 lb 3.6 oz), SpO2 97%.     Assessment/Plan   Alexandre Poole is a 64 y.o. male presenting for a " colonoscopy due to hematochezia and cross-sectional imaging showing L-sided colonic wall thickening suggestive of colitis.     PTA/Current Medications:  Medications Prior to Admission   Medication Sig Dispense Refill Last Dose/Taking    amLODIPine (Norvasc) 5 mg tablet TAKE 1 TABLET BY MOUTH EVERY DAY 90 tablet 1     fluticasone (Flonase) 50 mcg/actuation nasal spray Administer 1-2 sprays into each nostril once daily as needed for rhinitis or allergies. Shake gently. Before first use, prime pump. After use, clean tip and replace cap.       ibuprofen (MOTRIN ORAL) Take 800 mg by mouth 2 times a day as needed (pain).       Lactobacillus acidophilus (PROBIOTIC ORAL) Take 1 tablet by mouth once daily.       lisinopriL-hydrochlorothiazide 20-25 mg tablet TAKE 1 TABLET BY MOUTH EVERY DAY 90 tablet 0     NON FORMULARY Take 1 each by mouth 2 times a day. Z-Stack (Vitamin C 800 mg, Vitamin D3 125 mcg, Zinc 30 mg, Quercetin 500 mg)       [place]'s wort 300 mg tablet Take 1 tablet by mouth once daily.        Current Facility-Administered Medications   Medication Dose Route Frequency Provider Last Rate Last Admin    acetaminophen (Tylenol) tablet 650 mg  650 mg oral q4h PRN Anders Vasquez MD        Or    acetaminophen (Tylenol) oral liquid 650 mg  650 mg nasogastric tube q4h PRN Anders Vasquez MD        Or    acetaminophen (Tylenol) suppository 650 mg  650 mg rectal q4h PRN Anders Vasquez MD        amLODIPine (Norvasc) tablet 5 mg  5 mg oral Daily Renny Reynolds MD   5 mg at 11/27/24 0813    cefTRIAXone (Rocephin) 1 g in dextrose (iso) IV 50 mL  1 g intravenous q24h Renny Reynolds MD   Stopped at 11/27/24 0303    folic acid (Folvite) tablet 1 mg  1 mg oral Daily Renny Reynolds MD   1 mg at 11/27/24 0813    HYDROmorphone (Dilaudid) injection 0.5 mg  0.5 mg intravenous q4h PRN Renny Reynolds MD   0.5 mg at 11/27/24 0812    lisinopril 10 mg, hydroCHLOROthiazide 12.5 mg for Zestoretic/Prinizide   oral Daily Renny Reynolds  MD   Given at 11/27/24 0813    metroNIDAZOLE (Flagyl) 500 mg in sodium chloride (iso)  mL  500 mg intravenous q8h Renny Reynolds MD   Stopped at 11/27/24 1248    multivitamin with minerals 1 tablet  1 tablet oral Daily Renny Reynolds MD   1 tablet at 11/27/24 0813    ondansetron (Zofran) injection 4 mg  4 mg intravenous q4h PRN Anders Vasquez MD   4 mg at 11/27/24 0812    polyethylene glycol-electrolytes (Nulytely) solution 2,000 mL  2,000 mL oral Once PRN Anders Vaqsuez MD        [START ON 11/29/2024] thiamine (Vitamin B-1) tablet 100 mg  100 mg oral Daily Renny Reynolds MD        thiamine (Vitamin B1) injection 100 mg  100 mg intravenous Daily Renny Reynolds MD   100 mg at 11/27/24 0812     Suzanne Bragg MD

## 2024-11-27 NOTE — PROGRESS NOTES
"[facility]  [facility]  CONSULT FOLLOW-UP     Reason For Consult  Hematochezia     SUBJECTIVE     This morning he is close to being successfully prepped for planned colonoscopy this afternoon. No further bleeding with the bowel preparation.    EXAM     Last Recorded Vitals  Blood pressure 172/85, pulse 77, temperature 36.7 °C (98.1 °F), temperature source Temporal, resp. rate 18, height 1.753 m (5' 9\"), weight 88.1 kg (194 lb 3.6 oz), SpO2 94%.      Intake/Output Summary (Last 24 hours) at 11/27/2024 1316  Last data filed at 11/27/2024 1248  Gross per 24 hour   Intake 1279.17 ml   Output --   Net 1279.17 ml     Physical Exam   GENERAL: In no acute distress.  NEUROLOGIC: A and O x 3. Cranial nerves 2 through 12 grossly intact with no gait disturbances. Intact motor and sensory systems, with normal reflexes and coordination. No asterixis present.  HEENT: Pupils are equal, round, and reactive to light and accommodation. Extraocular motion intact. No scleral icterus.  CARDIAC: S1 + S2, RRR, no M/R/G.    LUNGS: CTA BL. No wheezes or coarse breath sounds. Symmetric respirations. No increased work of breathing.   ABDOMEN: Soft, non-tender to palpation. No rebound or guarding.  EXTREMITIES: Moving x4 equally and spontaneously with no joint warmth or swelling.    SKIN: Clean, warm, dry, and intact with normal skin turgor.  PSYCH: Appropriate mood and behavior.      OBJECTIVE                                                                              Medications             Current Facility-Administered Medications:     acetaminophen (Tylenol) tablet 650 mg, 650 mg, oral, q4h PRN **OR** acetaminophen (Tylenol) oral liquid 650 mg, 650 mg, nasogastric tube, q4h PRN **OR** acetaminophen (Tylenol) suppository 650 mg, 650 mg, rectal, q4h PRN, Anders Vasquez MD    amLODIPine (Norvasc) tablet 5 mg, 5 mg, oral, Daily, Renny Reynolds MD, 5 mg at 11/27/24 0813    cefTRIAXone " (Rocephin) 1 g in dextrose (iso) IV 50 mL, 1 g, intravenous, q24h, Renny Reynolds MD, Stopped at 11/27/24 0303    folic acid (Folvite) tablet 1 mg, 1 mg, oral, Daily, Renny Reynolds MD, 1 mg at 11/27/24 0813    HYDROmorphone (Dilaudid) injection 0.5 mg, 0.5 mg, intravenous, q4h PRN, Renny Reynolds MD, 0.5 mg at 11/27/24 0812    lisinopril 10 mg, hydroCHLOROthiazide 12.5 mg for Zestoretic/Prinizide, , oral, Daily, Renny Reynolds MD, Given at 11/27/24 0813    metroNIDAZOLE (Flagyl) 500 mg in sodium chloride (iso)  mL, 500 mg, intravenous, q8h, Renny Reynolds MD, Stopped at 11/27/24 1248    multivitamin with minerals 1 tablet, 1 tablet, oral, Daily, Renny Reynolds MD, 1 tablet at 11/27/24 0813    ondansetron (Zofran) injection 4 mg, 4 mg, intravenous, q4h PRN, Anders Vasquez MD, 4 mg at 11/27/24 0812    polyethylene glycol-electrolytes (Nulytely) solution 2,000 mL, 2,000 mL, oral, Once PRN, Anders Vasquez MD    [START ON 11/29/2024] thiamine (Vitamin B-1) tablet 100 mg, 100 mg, oral, Daily, Renny Reynolds MD    thiamine (Vitamin B1) injection 100 mg, 100 mg, intravenous, Daily, Renny Reynolds MD, 100 mg at 11/27/24 0812                                                                            Labs     Results for orders placed or performed during the hospital encounter of 11/26/24 (from the past 24 hours)   C. difficile, PCR    Specimen: Stool   Result Value Ref Range    C. difficile, PCR Not Detected Not Detected   Ceruloplasmin   Result Value Ref Range    Ceruloplasmin 21.3 20.0 - 60.0 mg/dL   Alpha-1-Antitrypsin   Result Value Ref Range    Alpha-1 Antitrypsin 183 84 - 218 mg/dL   IgG 4   Result Value Ref Range    IgG 4 42 3 - 200 mg/dL   AFP Tumor Marker   Result Value Ref Range    Alpha-Fetoprotein <4 0 - 9 ng/mL   Iron and TIBC   Result Value Ref Range    Iron 54 35 - 150 ug/dL    UIBC 245 110 - 370 ug/dL    TIBC 299 240 - 445 ug/dL    % Saturation 18 (L) 25 - 45 %   Transferrin   Result Value  Ref Range    Transferrin 216 200 - 360 mg/dL   CBC and Auto Differential   Result Value Ref Range    WBC 11.8 (H) 4.4 - 11.3 x10*3/uL    nRBC 0.0 0.0 - 0.0 /100 WBCs    RBC 5.13 4.50 - 5.90 x10*6/uL    Hemoglobin 15.2 13.5 - 17.5 g/dL    Hematocrit 44.6 41.0 - 52.0 %    MCV 87 80 - 100 fL    MCH 29.6 26.0 - 34.0 pg    MCHC 34.1 32.0 - 36.0 g/dL    RDW 12.6 11.5 - 14.5 %    Platelets 104 (L) 150 - 450 x10*3/uL    Neutrophils % 79.4 40.0 - 80.0 %    Immature Granulocytes %, Automated 0.6 0.0 - 0.9 %    Lymphocytes % 9.2 13.0 - 44.0 %    Monocytes % 10.3 2.0 - 10.0 %    Eosinophils % 0.2 0.0 - 6.0 %    Basophils % 0.3 0.0 - 2.0 %    Neutrophils Absolute 9.38 (H) 1.20 - 7.70 x10*3/uL    Immature Granulocytes Absolute, Automated 0.07 0.00 - 0.70 x10*3/uL    Lymphocytes Absolute 1.08 (L) 1.20 - 4.80 x10*3/uL    Monocytes Absolute 1.21 (H) 0.10 - 1.00 x10*3/uL    Eosinophils Absolute 0.02 0.00 - 0.70 x10*3/uL    Basophils Absolute 0.04 0.00 - 0.10 x10*3/uL   Magnesium   Result Value Ref Range    Magnesium 2.03 1.60 - 2.40 mg/dL   Hepatic function panel   Result Value Ref Range    Albumin 4.0 3.4 - 5.0 g/dL    Bilirubin, Total 1.2 0.0 - 1.2 mg/dL    Bilirubin, Direct 0.4 (H) 0.0 - 0.3 mg/dL    Alkaline Phosphatase 52 33 - 136 U/L    ALT 40 10 - 52 U/L    AST 48 (H) 9 - 39 U/L    Total Protein 6.9 6.4 - 8.2 g/dL   Type and screen   Result Value Ref Range    ABO TYPE O     Rh TYPE POS     ANTIBODY SCREEN NEG    Phosphorus   Result Value Ref Range    Phosphorus 3.4 2.5 - 4.9 mg/dL   Basic Metabolic Panel   Result Value Ref Range    Glucose 93 74 - 99 mg/dL    Sodium 139 136 - 145 mmol/L    Potassium 3.4 (L) 3.5 - 5.3 mmol/L    Chloride 99 98 - 107 mmol/L    Bicarbonate 30 21 - 32 mmol/L    Anion Gap 13 10 - 20 mmol/L    Urea Nitrogen 9 6 - 23 mg/dL    Creatinine 0.74 0.50 - 1.30 mg/dL    eGFR >90 >60 mL/min/1.73m*2    Calcium 8.7 8.6 - 10.6 mg/dL                                                                             Imaging     11/24/2024 CT A/P w/ and w/o IV contrast:  IMPRESSION:  1.  Left bernie colitis sparing the rectum as detailed above.  Infectious colitis or possibly colitis due to ischemia could have  this appearance.  2. Enlarged prostate with probable thickening of the bladder wall  though the bladder is not well distended.                                                                         GI Procedures     11/27/2024 Colonoscopy:  Final report pending.    ASSESSMENT / PLAN     ASSESSMENT/PLAN:    Alexandre Poole is a 64 y.o. male with a past medical history of HTN, transferred from [facility] on 11/26/2024 w/ hematochezia.      GI is consulted for hematochezia.      The patient is HDS and hgb is normal. 11/27/2024 S/p colonoscopy today, which showed mucosal changes in the transverse colon, splenic flexure, and descending colon suggestive of ischemic colitis. Biopsies obtained. On the differential IBD, although less likely.   The patient should get a surveillance colonoscopy per prior surveillance colonoscopy recommendations (per patient in 1-2 years from now). Our team will follow up on pathology. Supportive care per primary team.     Additionally, he was found to have thrombocytopenia w/ cross-sectional imaging suggestive of cirrhosis. Based on the patient's SH this is most likely alcohol associated. To evaluate for other possible etiologies causing chronic liver disease plan as below.     Recommendations:  #thrombocytopenia  #cross-sectional imaging suggestive of cirrhosis  - To complete the serologic work-up to evaluate for any viral, autoimmune, or metabolic causes of liver disease, and to evaluate for immunity to hepatitis A and B, please send off the following lab tests today:      Antinuclear antibody (SHASHI) - pending  Antimitochondrial antibody (AMA) - pending  Smooth muscle antibody (SMA) - pending  Ceruloplasmin - normal  Iron panel - normal  Ferritin - 636  Alpha-1-antitrypsin phenotype -  normal  Hepatitis A total antibody   Hepatitis B surface antigen - nonreactive  Hepatitis B surface antibody   Hepatitis C antibody - nonreactive  Alpha-fetoprotein (AFP) - normal  IgG level - normal  PEth - pending     - RUQ US w/ doppler pending.  - Please make sure the patient has an outpatient hepatology follow up on discharge.     The above recommendations were communicated to the [facility] team.     Patient was seen and  discussed with Dr. Mayer.     Gastroenterology will sign off.  During weekday hours of 7am-5pm please do not hesitate to contact me on [employer] Chat or page [phone], if there are any further questions between the weekday hours of 7am-5pm.   After hours, on weekends, and on holidays, please page the on-call GI fellow[place] at [phone]. Thank you.    Suzanne Bragg MD  PGY-5 Gastroenterology and Hepatology Fellow  [facility]

## 2024-11-27 NOTE — CARE PLAN
The patient's goals for the shift include      The clinical goals for the shift include Pts Hgb will remain above 7 through shift    Over the shift, the patient did not make progress toward the following goals. Barriers to progression include rectal bleeding. Recommendations to address these barriers include colonoscopy.

## 2024-11-27 NOTE — PROGRESS NOTES
Alexandre Poole is a 64 y.o. male on day 1 of admission presenting with Hematochezia.      Subjective     Finished bowel prep. No blood in stool overnight. Still has abdominal pain in LLQ, but not worse since admission.          Objective     Last Recorded Vitals  /85 (BP Location: Right arm, Patient Position: Lying)   Pulse 77   Temp 36.7 °C (98.1 °F) (Temporal)   Resp 18   Wt 88.1 kg (194 lb 3.6 oz)   SpO2 94%   Intake/Output last 3 Shifts:    Intake/Output Summary (Last 24 hours) at 11/27/2024 1400  Last data filed at 11/27/2024 1248  Gross per 24 hour   Intake 1279.17 ml   Output --   Net 1279.17 ml       Admission Weight  Weight: 88.1 kg (194 lb 3.6 oz) (11/26/24 0134)    Daily Weight  11/26/24 : 88.1 kg (194 lb 3.6 oz)    Image Results  ECG 12 lead  Normal sinus rhythm  Normal ECG  No previous ECGs available  See ED provider note for full interpretation and clinical correlation  Confirmed by Zaina Karimi ([id number]) on 11/26/2024 5:31:14 PM      Physical Exam    GEN: Awake, alert, NAD  HEENT: Head NCAT, MMM  CARDIO: Normal rate and rhythm  RESP: Lungs CTAB, normal WOB on RA  ABD: Soft, nt, nd  EXT: No edema of bilat LE  SKIN: Warm, dry, intact  NEURO: NFD, Aox4         Assessment/Plan        Alexandre Poole is a 64 y.o. male with PMH of HTN who is being admitted for further GI evaluation of hematochezia. Differential is broad including infectious volitis vs ischemic colitis, vs varices. Also cannot rule out diverticular bleed. Will workup for infectious causes and plan to scope him today.     #Bloody diarrhea  #C/f infectious colitis vs ischemic colitis  #C/f for gastric varices iso of chronic alcohol use  -Patient notes several nausea, abdominal pain, and diarrhea at 3 AM on 11/24  -First episode of diarrhea was normal, but subsequent episodes were bloody and bright red. (He has pictures on his phone)   -Notes he had double digit Bms that initial night  -Nothing like this has happened before  -Last  episode of diarrhea at [place] this morning was bloody  -CT shows Left bernie colitis sparing the rectum c/f for infectious colitis or ischemic colitis  -WBC slightly elevated at 12.2  -Patient has LLQ pain but CT does not show pericolnic fat strandind; Less concern for diverticuitis  -Patient notes his mother had colon cancer diagnosed in her early 50s  -Patient reports last colonoscopy was in 2017 and was normal besides 1 polyp  -Patient also notes he drinks 4 drinks of gin a day  PLAN:  - Colonoscopy today  - Infectious colitis rule out with enteric stool panel, stool OP pending  - C diff neg.  - Follow Blood cultures  - Continue empiric CTX and metronidazole  - RUQ US to look for cirrhosis  - Dilaudid 0.5 q4hr PRN for pain  - Zofran 4 mg IV q8hr for nausea     #Thrombocytopenia  -Possible differentials include liver dysfunction vs alcohol related bone marrow suppression vs vitamin k deficiency vs infection  PLAN:  -RUQ US as above  - Trend CBC  - Follow up smear     #Chronic alcohol use  -Patient notes he drinks 4 drinks of gin a day for many years (did not specify)  -Last drink was Saturday night before diarrhea Sunday morning  -Denies prior history of alcohol withdrawals  PLAN:  -CIWA protocol  -Thiamine 500 mg IV TID x3  -Folate 1 g daily     #HTN  -Continue home amlodipine 5 mg and lisinopril-hydrochlorothiazide 20-25 mg     F: PRN  E: PRN  N: CLD  A: PIV     DVT ppx: Hold iso of bloody diarrhea  GI ppx: IV PPI BID                Anders Vasquez MD

## 2024-11-27 NOTE — CARE PLAN
The patient's goals for the shift include      The clinical goals for the shift include Pt remain safe and free from injury throughout the shift 11/27/2024 0700.      Problem: Pain - Adult  Goal: Verbalizes/displays adequate comfort level or baseline comfort level  Outcome: Progressing     Problem: Safety - Adult  Goal: Free from fall injury  Outcome: Progressing     Problem: Discharge Planning  Goal: Discharge to home or other facility with appropriate resources  Outcome: Progressing     Problem: Chronic Conditions and Co-morbidities  Goal: Patient's chronic conditions and co-morbidity symptoms are monitored and maintained or improved  Outcome: Progressing     Problem: Pain  Goal: Takes deep breaths with improved pain control throughout the shift  Outcome: Progressing  Goal: Turns in bed with improved pain control throughout the shift  Outcome: Progressing  Goal: Walks with improved pain control throughout the shift  Outcome: Progressing  Goal: Performs ADL's with improved pain control throughout shift  Outcome: Progressing  Goal: Participates in PT with improved pain control throughout the shift  Outcome: Progressing  Goal: Free from opioid side effects throughout the shift  Outcome: Progressing  Goal: Free from acute confusion related to pain meds throughout the shift  Outcome: Progressing

## 2024-11-28 ENCOUNTER — PHARMACY VISIT (OUTPATIENT)
Dept: PHARMACY | Facility: CLINIC | Age: 64
End: 2024-11-28
Payer: MEDICARE

## 2024-11-28 VITALS
RESPIRATION RATE: 18 BRPM | WEIGHT: 194.22 LBS | BODY MASS INDEX: 28.77 KG/M2 | HEIGHT: 69 IN | OXYGEN SATURATION: 97 % | DIASTOLIC BLOOD PRESSURE: 73 MMHG | HEART RATE: 68 BPM | SYSTOLIC BLOOD PRESSURE: 146 MMHG | TEMPERATURE: 98.4 F

## 2024-11-28 LAB
ALBUMIN SERPL BCP-MCNC: 3.7 G/DL (ref 3.4–5)
ALP SERPL-CCNC: 43 U/L (ref 33–136)
ALT SERPL W P-5'-P-CCNC: 41 U/L (ref 10–52)
ANION GAP SERPL CALC-SCNC: 13 MMOL/L (ref 10–20)
AST SERPL W P-5'-P-CCNC: 46 U/L (ref 9–39)
BASOPHILS # BLD AUTO: 0.03 X10*3/UL (ref 0–0.1)
BASOPHILS NFR BLD AUTO: 0.4 %
BILIRUB DIRECT SERPL-MCNC: 0.2 MG/DL (ref 0–0.3)
BILIRUB SERPL-MCNC: 0.8 MG/DL (ref 0–1.2)
BUN SERPL-MCNC: 12 MG/DL (ref 6–23)
CALCIUM SERPL-MCNC: 8.8 MG/DL (ref 8.6–10.6)
CHLORIDE SERPL-SCNC: 103 MMOL/L (ref 98–107)
CO2 SERPL-SCNC: 29 MMOL/L (ref 21–32)
CREAT SERPL-MCNC: 0.86 MG/DL (ref 0.5–1.3)
EGFRCR SERPLBLD CKD-EPI 2021: >90 ML/MIN/1.73M*2
EOSINOPHIL # BLD AUTO: 0.2 X10*3/UL (ref 0–0.7)
EOSINOPHIL NFR BLD AUTO: 2.8 %
ERYTHROCYTE [DISTWIDTH] IN BLOOD BY AUTOMATED COUNT: 12.7 % (ref 11.5–14.5)
GLUCOSE SERPL-MCNC: 90 MG/DL (ref 74–99)
HAV AB SER QL IA: REACTIVE
HBV SURFACE AB SER-ACNC: <3.1 MIU/ML
HCT VFR BLD AUTO: 40.4 % (ref 41–52)
HGB BLD-MCNC: 14 G/DL (ref 13.5–17.5)
IMM GRANULOCYTES # BLD AUTO: 0.03 X10*3/UL (ref 0–0.7)
IMM GRANULOCYTES NFR BLD AUTO: 0.4 % (ref 0–0.9)
LYMPHOCYTES # BLD AUTO: 1.07 X10*3/UL (ref 1.2–4.8)
LYMPHOCYTES NFR BLD AUTO: 15 %
MAGNESIUM SERPL-MCNC: 2.14 MG/DL (ref 1.6–2.4)
MCH RBC QN AUTO: 30.4 PG (ref 26–34)
MCHC RBC AUTO-ENTMCNC: 34.7 G/DL (ref 32–36)
MCV RBC AUTO: 88 FL (ref 80–100)
MONOCYTES # BLD AUTO: 0.84 X10*3/UL (ref 0.1–1)
MONOCYTES NFR BLD AUTO: 11.8 %
NEUTROPHILS # BLD AUTO: 4.95 X10*3/UL (ref 1.2–7.7)
NEUTROPHILS NFR BLD AUTO: 69.6 %
NRBC BLD-RTO: 0 /100 WBCS (ref 0–0)
PHOSPHATE SERPL-MCNC: 3.4 MG/DL (ref 2.5–4.9)
PLATELET # BLD AUTO: 96 X10*3/UL (ref 150–450)
POTASSIUM SERPL-SCNC: 3.8 MMOL/L (ref 3.5–5.3)
PROT SERPL-MCNC: 6.5 G/DL (ref 6.4–8.2)
RBC # BLD AUTO: 4.61 X10*6/UL (ref 4.5–5.9)
SODIUM SERPL-SCNC: 141 MMOL/L (ref 136–145)
WBC # BLD AUTO: 7.1 X10*3/UL (ref 4.4–11.3)

## 2024-11-28 PROCEDURE — 2500000001 HC RX 250 WO HCPCS SELF ADMINISTERED DRUGS (ALT 637 FOR MEDICARE OP)

## 2024-11-28 PROCEDURE — 84100 ASSAY OF PHOSPHORUS: CPT

## 2024-11-28 PROCEDURE — RXMED WILLOW AMBULATORY MEDICATION CHARGE

## 2024-11-28 PROCEDURE — 85025 COMPLETE CBC W/AUTO DIFF WBC: CPT

## 2024-11-28 PROCEDURE — 99239 HOSP IP/OBS DSCHRG MGMT >30: CPT

## 2024-11-28 PROCEDURE — 36415 COLL VENOUS BLD VENIPUNCTURE: CPT

## 2024-11-28 PROCEDURE — 83735 ASSAY OF MAGNESIUM: CPT

## 2024-11-28 PROCEDURE — 80048 BASIC METABOLIC PNL TOTAL CA: CPT

## 2024-11-28 PROCEDURE — 82248 BILIRUBIN DIRECT: CPT

## 2024-11-28 PROCEDURE — 2500000004 HC RX 250 GENERAL PHARMACY W/ HCPCS (ALT 636 FOR OP/ED)

## 2024-11-28 PROCEDURE — 86708 HEPATITIS A ANTIBODY: CPT

## 2024-11-28 RX ORDER — FOLIC ACID 1 MG/1
1 TABLET ORAL DAILY
Qty: 30 TABLET | Refills: 0 | Status: SHIPPED | OUTPATIENT
Start: 2024-11-29 | End: 2024-12-29

## 2024-11-28 RX ORDER — NALTREXONE HYDROCHLORIDE 50 MG/1
TABLET, FILM COATED ORAL
Qty: 34 TABLET | Refills: 0 | Status: SHIPPED | OUTPATIENT
Start: 2024-11-28 | End: 2025-01-04

## 2024-11-28 RX ORDER — AMOXICILLIN AND CLAVULANATE POTASSIUM 875; 125 MG/1; MG/1
1 TABLET, FILM COATED ORAL EVERY 12 HOURS SCHEDULED
Status: DISCONTINUED | OUTPATIENT
Start: 2024-11-28 | End: 2024-11-28 | Stop reason: HOSPADM

## 2024-11-28 RX ORDER — AMOXICILLIN AND CLAVULANATE POTASSIUM 875; 125 MG/1; MG/1
875 TABLET, FILM COATED ORAL 2 TIMES DAILY
Qty: 10 TABLET | Refills: 0 | Status: SHIPPED | OUTPATIENT
Start: 2024-11-28 | End: 2024-12-03

## 2024-11-28 RX ORDER — NALTREXONE HYDROCHLORIDE 50 MG/1
50 TABLET, FILM COATED ORAL DAILY
Qty: 30 TABLET | Refills: 3 | Status: SHIPPED | OUTPATIENT
Start: 2024-11-28 | End: 2024-11-28

## 2024-11-28 RX ORDER — AMOXICILLIN AND CLAVULANATE POTASSIUM 875; 125 MG/1; MG/1
875 TABLET, FILM COATED ORAL 2 TIMES DAILY
Qty: 10 TABLET | Refills: 0 | Status: SHIPPED | OUTPATIENT
Start: 2024-11-28 | End: 2024-11-28

## 2024-11-28 RX ORDER — MULTIVIT-MIN/IRON FUM/FOLIC AC 7.5 MG-4
1 TABLET ORAL DAILY
Qty: 30 TABLET | Refills: 0 | Status: SHIPPED | OUTPATIENT
Start: 2024-11-29 | End: 2024-12-29

## 2024-11-28 RX ORDER — LANOLIN ALCOHOL/MO/W.PET/CERES
100 CREAM (GRAM) TOPICAL DAILY
Qty: 30 TABLET | Refills: 0 | Status: SHIPPED | OUTPATIENT
Start: 2024-11-29 | End: 2024-12-29

## 2024-11-28 RX ADMIN — METRONIDAZOLE 500 MG: 500 INJECTION, SOLUTION INTRAVENOUS at 01:02

## 2024-11-28 RX ADMIN — THIAMINE HYDROCHLORIDE 100 MG: 100 INJECTION, SOLUTION INTRAMUSCULAR; INTRAVENOUS at 09:58

## 2024-11-28 RX ADMIN — METRONIDAZOLE 500 MG: 500 INJECTION, SOLUTION INTRAVENOUS at 10:01

## 2024-11-28 RX ADMIN — AMLODIPINE BESYLATE 5 MG: 5 TABLET ORAL at 09:58

## 2024-11-28 RX ADMIN — FOLIC ACID 1 MG: 1 TABLET ORAL at 09:58

## 2024-11-28 RX ADMIN — Medication 1 TABLET: at 09:58

## 2024-11-28 RX ADMIN — CEFTRIAXONE SODIUM 1 G: 1 INJECTION, SOLUTION INTRAVENOUS at 02:32

## 2024-11-28 RX ADMIN — HYDROCHLOROTHIAZIDE: 25 TABLET ORAL at 09:58

## 2024-11-28 ASSESSMENT — PAIN SCALES - GENERAL: PAINLEVEL_OUTOF10: 0 - NO PAIN

## 2024-11-28 NOTE — DISCHARGE INSTRUCTIONS
Mr. Poole,    You were admitted for abnormal bowel movements, with noted bright red blood in your stool and diarrhea. We obtained a CT scan that was unremarkable for any acute abnormalities of the bowel that could have explained this bleeding. We also performed a colonoscopy, and though there was no active signs of bleeding, there was evidence of bowel inflammation and transient reduced blood flow to parts of your colon. Your hemoglobin, which is a marker for your body's red blood cells, remained stable during your admission, and once your bleeding resolved, you were safe for discharge.     We did a broad infectious workup as well for the diarrhea that came back unremarkable.      Of note, the CT scan that was obtained on admission did demonstrate that your liver has signs of chronic inflammation and damage, and was concerning for a cirrhotic appearance. The ultrasound we obtained was significant for fatty liver    As for why this episode of bloody diarrhea happened, we truthfully were unable to pinpoint a direct cause. This could have been due to a transient ischemic event, hemorrhoid that resolved, or virus. If you continue to have bloody diarrhea, or have more systemic symptoms like fever, chills, passing out, dizziness, do not hesitate to reach back out and be evaluated.     Your  Care Team    To Do:    - Please take Naltrexone half tablet for one week, followed by a full 50mg tablet from then on out. This should help curb some of the cravings associated with alcohol consumption.   - Abstain from excessive alcohol consumption  - Please bring your discharge paperwork to your next Primary Care Appointment   - We will notify you of any abnormal results from your pathology of the colonoscopy    Medication Changes:    - Adding Naltrexone 50mg daily for alcohol cravings, please take 25 mg for one week followed by 50 mg thereafter  - Please take Thiamine and Folic Acid vitamins daily  - Stop taking Grzegorz's  Worts  - Take Augmentin 875/125mg antibiotic twice daily by mouth for 5 days    Appointments:    - Please follow up with our Hepatology team. They will call you to schedule an appointment.  - Please follow up with your primary care physician    If you have not received a call to schedule and appointment within 2 days, please call [phone]. Please bring with you to the appointment a photo ID and insurance card. Please also bring a list of all of the medications that you are currently taking to this appointment as well so these can be reviewed.

## 2024-11-28 NOTE — CARE PLAN
The patient's goals for the shift include      The clinical goals for the shift include Pt will remain safe and free from injury throughout the shift 11/28/24 0700.    Problem: Pain - Adult  Goal: Verbalizes/displays adequate comfort level or baseline comfort level  Outcome: Progressing     Problem: Safety - Adult  Goal: Free from fall injury  Outcome: Progressing     Problem: Discharge Planning  Goal: Discharge to home or other facility with appropriate resources  Outcome: Progressing     Problem: Chronic Conditions and Co-morbidities  Goal: Patient's chronic conditions and co-morbidity symptoms are monitored and maintained or improved  Outcome: Progressing     Problem: Pain  Goal: Takes deep breaths with improved pain control throughout the shift  Outcome: Progressing  Goal: Turns in bed with improved pain control throughout the shift  Outcome: Progressing  Goal: Walks with improved pain control throughout the shift  Outcome: Progressing  Goal: Performs ADL's with improved pain control throughout shift  Outcome: Progressing  Goal: Participates in PT with improved pain control throughout the shift  Outcome: Progressing  Goal: Free from opioid side effects throughout the shift  Outcome: Progressing  Goal: Free from acute confusion related to pain meds throughout the shift  Outcome: Progressing

## 2024-11-28 NOTE — DISCHARGE SUMMARY
Discharge Diagnosis  Hematochezia    Issues Requiring Follow-Up  Abnormal CT Scan of the Abdomen   Pathology Results from Colonoscopy    Discharge Meds     Medication List      START taking these medications     amoxicillin-pot clavulanate 875-125 mg tablet; Commonly known as:   Augmentin; Take 1 tablet (875 mg) by mouth 2 times a day for 5 days.   Certavite-Antioxidant  mg-mcg tablet; Generic drug: multivitamin   with minerals; Take 1 tablet by mouth once daily.; Start taking on:   November 29, 2024   folic acid 1 mg tablet; Commonly known as: Folvite; Take 1 tablet (1 mg)   by mouth once daily.; Start taking on: November 29, 2024   naltrexone 50 mg tablet; Commonly known as: Depade; Take 0.5 tablets (25   mg) by mouth once daily for 7 days, THEN 1 tablet (50 mg) once daily.;   Start taking on: November 28, 2024   thiamine 100 mg tablet; Commonly known as: Vitamin B-1; Take 1 tablet   (100 mg) by mouth once daily. Do not take before November 29, 2024.; Start   taking on: November 29, 2024     CONTINUE taking these medications     amLODIPine 5 mg tablet; Commonly known as: Norvasc; TAKE 1 TABLET BY   MOUTH EVERY DAY   fluticasone 50 mcg/actuation nasal spray; Commonly known as: Flonase   lisinopriL-hydrochlorothiazide 20-25 mg tablet; TAKE 1 TABLET BY MOUTH   EVERY DAY   NON FORMULARY   PROBIOTIC ORAL     STOP taking these medications     MOTRIN ORAL   [place]'s wort 300 mg tablet       Test Results Pending At Discharge  Pending Labs       Order Current Status    Anti-smooth muscle antibody, IgG In process    Antimitochondrial antibody In process    Cryptosporidium Antigen, Stool In process    Giardia Antigen In process    Methylmalonic acid, serum In process    Ova/Para + Giardia/Cryptosporidium Antigen In process    Phosphatidylethanol (PEth), Whole Blood, Quantitative In process    Surgical Pathology Exam In process    Blood Culture Preliminary result    Blood Culture Preliminary result            [facility]  Course     Alexandre Poole is a 64 y.o. male with PMH of HTN who originally presented to [place] ED on 11/24 for nausea, abdominal pain, and bloody diarrhea. He notes he woke up Sunday 11/24 at 3 AM with nausea, abdominal pain, and diarrhea. He was transferred to [facility] for further GI evaluation.     Initial CT imaging demonstrated left bernie colitis sparing the rectum. Infectious colitis or possibly colitis due to ischemia could have  this appearance. Liver was also cirrhotic in appearance.     Lab workup for hematochezia including stool pathogen panel, C diff was unremarkable. A colonoscopy was performed 11/27, which showed mucosal changes in the transverse colon, splenic flexure, and descending colon suggestive of ischemic colitis. Biopsies obtained. On the differential was IBD, although less likely. Surgical specimens are to be followed up and communicated with him.     His course was complicated by thrombocytopenia w/ cross-sectional imaging suggestive of cirrhosis on CT as mentioned. Based on the patient's SH this is most likely alcohol associated. Hepatitis viral panel was negative, and broad cirrhosis labs were pursued including AMA, SMA, Iron studies, AFP. Unremarkable at time of discharge.     RUQ U/S was obtained while here that demonstrated fatty liver but no signs of cirrhosis.    Hemoglobin was stable throughout admission, and he stopped having bloody bowel movements before colonoscopy. He was discharge with plans to follow up with Hepatology and has plans to stop alcohol use, including taking Naltrexone.     Pertinent Physical Exam At Time of Discharge  Physical Exam    GEN: Awake, alert, NAD  HEENT: Head NCAT, MMM  CARDIO: Normal rate and rhythm  RESP: Lungs CTAB, normal WOB on RA  ABD: Soft, nt, nd  EXT: No edema of bilat LE  SKIN: Warm, dry, intact  NEURO: NFD, Aox4      Outpatient Follow-Up  No future appointments.      Anders Vasquez MD

## 2024-11-29 LAB
CRYPTOSP AG STL QL IA: NEGATIVE
G LAMBLIA AG STL QL IA: NEGATIVE
LABORATORY REPORT: NORMAL
METHYLMALONATE SERPL-SCNC: 0.14 UMOL/L (ref 0–0.4)
MITOCHONDRIA AB SER QL IF: NEGATIVE
PETH INTERPRETATION: NORMAL
PLPETH BLD-MCNC: 219 NG/ML
POPETH BLD-MCNC: 312 NG/ML
SMOOTH MUSCLE AB SER QL IF: NEGATIVE

## 2024-11-30 LAB
BACTERIA BLD CULT: NORMAL
BACTERIA BLD CULT: NORMAL

## 2024-12-02 NOTE — ED PROVIDER NOTES
Chief Complaint: Abdominal pain  HPI: This is a 64-year-old male, presenting to the emergency department for left lower quadrant abdominal pain for the last several hours, as well as bloody stool.  Patient states that he feels cramping pain, and then has a frankly bloody bowel movement.  He denies any fevers or chills.  He also complains of nausea however denies any vomiting.    Past Medical History:   Diagnosis Date    Colon polyp     Hypertension       Past Surgical History:   Procedure Laterality Date    COLONOSCOPY      HERNIA REPAIR      KNEE ARTHROSCOPY W/ MENISCAL REPAIR Right     WRIST SURGERY Left        Physical Exam  Vitals and nursing note reviewed.   Constitutional:       Appearance: Normal appearance.   HENT:      Head: Normocephalic and atraumatic.      Mouth/Throat:      Mouth: Mucous membranes are moist.   Eyes:      Extraocular Movements: Extraocular movements intact.   Pulmonary:      Effort: Pulmonary effort is normal.   Abdominal:      General: Abdomen is flat.      Palpations: Abdomen is soft.      Tenderness: There is generalized abdominal tenderness. There is no guarding or rebound.   Skin:     General: Skin is warm.   Neurological:      General: No focal deficit present.      Mental Status: He is alert.   Psychiatric:         Mood and Affect: Mood normal.          ED Course/[facility]  ED Course as of 12/02/24 1622   Sun Nov 24, 2024   1545 Dr. Ceja, GI at [facility], they accept the patient to [facility] []      ED Course User Index  [JH] Lainey Ding DO         Diagnoses as of 12/02/24 1622   Gastrointestinal hemorrhage, unspecified gastrointestinal hemorrhage type       This is a 64 y.o. male presenting to the ED for evaluation of abdominal pain for the last several hours.  Patient also had an episode of bright red blood per rectum, and so presents to the emergency department.  On physical exam, patient is resting comfortably in bed, no acute distress.  Abdomen does have diffuse tenderness  however is soft without rebound or guarding.  Rectal exam does not show any violetta bleeding, however there is maroon-colored stool on the glove after internal exam.  Lab work is overall grossly unremarkable.  CT imaging was obtained, and does show left hemic colitis sparing the rectum as well as an enlarged prostate.  I did consult GI for the patient, discussed case over the phone, given the melanotic stool and concerning story, they accept the patient for admission to [facility].  Patient will be transferred for further evaluation     Final Impression  1.  GI bleed  Disposition/Plan: Transfer   Condition at disposition: Stable.     Lainey Ding DO  Emergency Medicine Physician     Lainey Ding,   12/02/24 3419

## 2024-12-16 LAB
LABORATORY COMMENT REPORT: NORMAL
PATH REPORT.FINAL DX SPEC: NORMAL
PATH REPORT.GROSS SPEC: NORMAL
PATH REPORT.TOTAL CANCER: NORMAL

## 2025-01-06 NOTE — DOCUMENTATION CLARIFICATION NOTE
"    PATIENT:               GRACIELA THORNTON  ACCT #:                  [id number]  MRN:                       [id number]  :                       1960  ADMIT DATE:       2024 1:19 AM  DISCH DATE:        2024 1:36 PM  RESPONDING PROVIDER #:        [id number]          PROVIDER RESPONSE TEXT:    Pathology findings of  ISCHEMIC TYPE INJURY, Colon    CDI QUERY TEXT:    Clarification        Instruction:    Based on your assessment of the patient and the clinical information, please provide the requested documentation by clicking on the appropriate radio button and enter any additional information if prompted.    Question: Based on your assessment of the patient and the pathology findings, can the pathology findings be confirmed by providing the requested documentation to include if specimen is benign or malignant, primary or secondary and any metastatic sites.  Please include diagnosis of disease    When answering this query, please exercise your independent professional judgment. The fact that a question is being asked, does not imply that any particular answer is desired or expected.    The patient's clinical indicators include:  Clinical Information:  Discharge Summary 24 by Resident Anders Vasquez MD:    \"64 y.o. male with PMH of HTN who originally presented to [place] ED on  for nausea, abdominal pain, and bloody diarrhea. He notes he woke up  at 3 AM with nausea, abdominal pain, and diarrhea. He was transferred to [facility] for further GI evaluation.\"    Surgical Pathology from 24 resulted as -    FINAL DIAGNOSIS  \"A. COLON, DESCENDING, BIOPSY:  --  COLONIC MUCOSA WITH FOCAL CHANGES OF ISCHEMIC TYPE INJURY.    Part A discussed at the GI consensus conference on 2024.    B. COLON, SIGMOID, POLYP, POLYPECTOMY:  --  FRAGMENTS OF TUBULAR ADENOMA.    C. COLON, RECTUM, BIOPSY:  -- COLONIC MUCOSA WITH NO SIGNIFICANT PATHOLOGIC FINDINGS.    D. COLON, RECTUM, POLYP, POLYPECTOMY:  --  " "TUBULAR ADENOMA.\"    Clinical Indicators:  Discharge Summary 11/28/24 by Resident Anders Vasquez MD:    \"Lab workup for hematochezia including stool pathogen panel, C diff was unremarkable. A colonoscopy was performed 11/27, which showed mucosal changes in the transverse colon, splenic flexure, and descending colon suggestive of ischemic colitis.\"    Treatment: s/p Colonoscopy with Biopsy    Risk Factors:  ischemic colitis  Options provided:  -- Pathology findings of  ISCHEMIC TYPE INJURY, Colon, Please specify additional information below  -- Other - I will add my own diagnosis  -- Refer to Clinical Documentation Reviewer    Query created by: Lovely Lozoya on 12/17/2024 10:01 AM      Electronically signed by:  OPAL CHATMAN MD 1/6/2025 6:36 PM          "

## 2025-02-08 ASSESSMENT — PROMIS GLOBAL HEALTH SCALE
CARRYOUT_SOCIAL_ACTIVITIES: VERY GOOD
RATE_PHYSICAL_HEALTH: VERY GOOD
RATE_QUALITY_OF_LIFE: VERY GOOD
RATE_MENTAL_HEALTH: VERY GOOD
RATE_AVERAGE_PAIN: 1
RATE_AVERAGE_FATIGUE: MILD
CARRYOUT_PHYSICAL_ACTIVITIES: COMPLETELY
RATE_SOCIAL_SATISFACTION: VERY GOOD
RATE_GENERAL_HEALTH: VERY GOOD
EMOTIONAL_PROBLEMS: RARELY

## 2025-02-10 ENCOUNTER — OFFICE VISIT (OUTPATIENT)
Dept: PRIMARY CARE | Facility: CLINIC | Age: 65
End: 2025-02-10
Payer: COMMERCIAL

## 2025-02-10 VITALS
HEART RATE: 60 BPM | WEIGHT: 188 LBS | BODY MASS INDEX: 27.76 KG/M2 | DIASTOLIC BLOOD PRESSURE: 78 MMHG | SYSTOLIC BLOOD PRESSURE: 138 MMHG

## 2025-02-10 DIAGNOSIS — I10 PRIMARY HYPERTENSION: ICD-10-CM

## 2025-02-10 DIAGNOSIS — J06.9 VIRAL URI: ICD-10-CM

## 2025-02-10 DIAGNOSIS — Z00.00 WELLNESS EXAMINATION: Primary | ICD-10-CM

## 2025-02-10 DIAGNOSIS — N40.0 ENLARGED PROSTATE: ICD-10-CM

## 2025-02-10 DIAGNOSIS — K55.9 ISCHEMIC COLITIS (MULTI): ICD-10-CM

## 2025-02-10 PROCEDURE — 1036F TOBACCO NON-USER: CPT | Performed by: FAMILY MEDICINE

## 2025-02-10 PROCEDURE — 3075F SYST BP GE 130 - 139MM HG: CPT | Performed by: FAMILY MEDICINE

## 2025-02-10 PROCEDURE — 99396 PREV VISIT EST AGE 40-64: CPT | Performed by: FAMILY MEDICINE

## 2025-02-10 PROCEDURE — 3078F DIAST BP <80 MM HG: CPT | Performed by: FAMILY MEDICINE

## 2025-02-10 ASSESSMENT — ENCOUNTER SYMPTOMS
TROUBLE SWALLOWING: 0
SLEEP DISTURBANCE: 0
PALPITATIONS: 0
DYSPHORIC MOOD: 0
DIZZINESS: 0
DIFFICULTY URINATING: 0
UNEXPECTED WEIGHT CHANGE: 0
FATIGUE: 0
ABDOMINAL PAIN: 0
HEADACHES: 0
POLYDIPSIA: 0
NERVOUS/ANXIOUS: 0
CHEST TIGHTNESS: 0
SHORTNESS OF BREATH: 0
BLOOD IN STOOL: 0
DIARRHEA: 0
CONSTIPATION: 0

## 2025-02-10 ASSESSMENT — PATIENT HEALTH QUESTIONNAIRE - PHQ9
1. LITTLE INTEREST OR PLEASURE IN DOING THINGS: NOT AT ALL
SUM OF ALL RESPONSES TO PHQ9 QUESTIONS 1 AND 2: 0
2. FEELING DOWN, DEPRESSED OR HOPELESS: NOT AT ALL

## 2025-02-10 ASSESSMENT — PAIN SCALES - GENERAL: PAINLEVEL_OUTOF10: 2

## 2025-02-10 NOTE — PATIENT INSTRUCTIONS
Follow up with the gastroenterologist. See if you need follow up for the ischemic colitis.     Labs look good from the fall, including cholesterol.

## 2025-02-10 NOTE — PROGRESS NOTES
Subjective   Patient ID: Alexandre Poole is a 64 y.o. male who presents for Annual Exam, Cough, and Nasal Congestion.    HPI   He presents today for a wellness visit.  Overall, he states he has been doing well.    Back in November an episode of rectal bleeding.  Subsequently underwent colonoscopy and found to have ischemic colitis.  He is following up with a hepatologist this month, he states.  He states he was continually asked how much he drinks and states he would always have 3 or 4 drinks a day and got the thinking about it and then just quit drinking altogether after that hospitalization.  Has not had any since then.  He states he is doing well with that.  They gave him Traxam but he did not need it at all.    Over the last few days has had runny nose and some overall fatigue.    Review of Systems   Constitutional:  Negative for fatigue and unexpected weight change.   HENT:  Negative for congestion and trouble swallowing.    Respiratory:  Negative for chest tightness and shortness of breath.    Cardiovascular:  Negative for chest pain, palpitations and leg swelling.   Gastrointestinal:  Negative for abdominal pain, blood in stool, constipation and diarrhea.   Endocrine: Negative for polydipsia and polyuria.   Genitourinary:  Negative for difficulty urinating.   Neurological:  Negative for dizziness and headaches.   Psychiatric/Behavioral:  Negative for dysphoric mood and sleep disturbance. The patient is not nervous/anxious.    He was found to have a large prostate on exam during that time rectal bleeding.  Also the CT scan confirms that.  However, he has no trouble with urination at all.    Objective   /78   Pulse 60   Wt 85.3 kg (188 lb)   BMI 27.76 kg/m²     Physical Exam  Vitals reviewed.   Constitutional:       Appearance: Normal appearance.   HENT:      Right Ear: Tympanic membrane, ear canal and external ear normal.      Left Ear: Tympanic membrane, ear canal and external ear normal.      Nose:  Nose normal.      Mouth/Throat:      Mouth: Mucous membranes are moist.      Pharynx: Oropharynx is clear.   Eyes:      Conjunctiva/sclera: Conjunctivae normal.   Neck:      Thyroid: No thyromegaly or thyroid tenderness.      Vascular: No carotid bruit.   Cardiovascular:      Rate and Rhythm: Normal rate and regular rhythm.      Heart sounds: No murmur heard.  Pulmonary:      Effort: Pulmonary effort is normal.      Breath sounds: Normal breath sounds.   Abdominal:      General: Abdomen is flat.      Palpations: Abdomen is soft. There is no mass.      Tenderness: There is no abdominal tenderness.   Musculoskeletal:      Cervical back: Neck supple.      Right lower leg: No edema.      Left lower leg: No edema.   Lymphadenopathy:      Cervical: No cervical adenopathy.   Skin:     General: Skin is warm and dry.   Neurological:      Mental Status: He is alert.   Psychiatric:         Mood and Affect: Mood normal.       Assessment/Plan   Diagnoses and all orders for this visit:  Wellness examination  Primary hypertension  Viral URI  Ischemic colitis (Multi)  Enlarged prostate  Overall doing well.  Does have a viral URI.  Will see how his symptoms go over the next 2 to 3 days.  Pending not getting better or if things are getting worse let me know.  He will follow-up with the hepatologist/gastroenterologist.  If they are have a plan to follow-up with ischemic colitis.  He has a large prostate, there is no BPH symptoms.  Blood pressure is well-controlled.  Lab tests were good back in November, including his cholesterol.

## 2025-02-13 ENCOUNTER — OFFICE VISIT (OUTPATIENT)
Dept: GASTROENTEROLOGY | Facility: CLINIC | Age: 65
End: 2025-02-13
Payer: COMMERCIAL

## 2025-02-13 VITALS
HEART RATE: 61 BPM | SYSTOLIC BLOOD PRESSURE: 168 MMHG | DIASTOLIC BLOOD PRESSURE: 86 MMHG | WEIGHT: 188 LBS | TEMPERATURE: 97.7 F | BODY MASS INDEX: 27.76 KG/M2

## 2025-02-13 DIAGNOSIS — K70.30 ALCOHOLIC CIRRHOSIS OF LIVER WITHOUT ASCITES (MULTI): Primary | ICD-10-CM

## 2025-02-13 DIAGNOSIS — R93.5 ABNORMAL CT OF THE ABDOMEN: ICD-10-CM

## 2025-02-13 PROCEDURE — 99215 OFFICE O/P EST HI 40 MIN: CPT | Performed by: STUDENT IN AN ORGANIZED HEALTH CARE EDUCATION/TRAINING PROGRAM

## 2025-02-13 PROCEDURE — 3077F SYST BP >= 140 MM HG: CPT | Performed by: STUDENT IN AN ORGANIZED HEALTH CARE EDUCATION/TRAINING PROGRAM

## 2025-02-13 PROCEDURE — 3079F DIAST BP 80-89 MM HG: CPT | Performed by: STUDENT IN AN ORGANIZED HEALTH CARE EDUCATION/TRAINING PROGRAM

## 2025-02-13 ASSESSMENT — PAIN SCALES - GENERAL: PAINLEVEL_OUTOF10: 0-NO PAIN

## 2025-02-13 NOTE — PROGRESS NOTES
[facility]  [facility]  Clinic Note      Patient Information  Alexandre Poole                                                                 Subjective:     HPI:    Alexandre Poole is an 64 y.o. male with PMH HTN and recent episode of ischemic colitis who presents to [facility] for initial consultation for cirrhotic morphology incidentally noted on imaging during recent hospitalization for ischemic colitis.     The patient denies knowledge of liver disease prior to recent admission 11/24-11/28/24. He does have a mild hepatocellular elevation of liver enzymes dating back to 2021.    He does have long history of excessive alcohol use with last drink prior to recent admission. He would usually drink 4-6 unmeasured drinks of gin per day. A half gallon would last him about a week. He drank approximately this amount for 20+ years. Upon discharge, he was given a script for naltrexone, however, he has maintained abstinence without use.     His baseline weight is about 190 lbs. Previously he was about 260 lbs before dieting during COVID. He is sedentary.     He is a former smoker who quit in 2007. No other substance use, IDVU, or tattoos. He has never had a blood transfusion. He takes vitamin D, C, and zinc. He previously took Katelin's wort prior to hospitalization. No other OTC supplements or herbal products.    No family history of liver disease.     During recent hospitalization, CT incidentally noted nodular hepatic morphology without focal lesion. This was re-demonstrated on ultrasound with doppler. Thrombocytopenia in the setting of sepsis and chronic excessive alcohol use also noted. No splenomegaly. Chronic liver disease evaluation notable for elevated Peth as well as elevated ferritin in the setting of acute inflammation.    Past Medical History:   Past Medical History:   Diagnosis Date    Colon polyp     Hypertension        Past Surgical History:   Past  Surgical History:   Procedure Laterality Date    COLONOSCOPY      HERNIA REPAIR      KNEE ARTHROSCOPY W/ MENISCAL REPAIR Right     WRIST SURGERY Left        Past Family History: No family history on file.    Social History:   Social History     Tobacco Use   Smoking Status Former    Types: Cigarettes   Smokeless Tobacco Never     Social History     Substance and Sexual Activity   Alcohol Use Not Currently    Alcohol/week: 28.0 standard drinks of alcohol    Types: 28 Standard drinks or equivalent per week     Social History     Substance and Sexual Activity   Drug Use Yes    Types: Marijuana       Allergies: No Known Allergies    MEDS:  Current Outpatient Medications   Medication Instructions    amLODIPine (NORVASC) 5 mg, oral, Daily    fluticasone (Flonase) 50 mcg/actuation nasal spray 1-2 sprays, Daily PRN    Lactobacillus acidophilus (PROBIOTIC ORAL) 1 tablet, Daily    lisinopriL-hydrochlorothiazide 20-25 mg tablet 1 tablet, oral, Daily    NON FORMULARY 1 each, 2 times daily        ROS:   General: no chills, no fevers  Cardiovascular: no chest pain, no palpitations  Others in 12 systems ROS were discussed and negative. Positive pertinent systems are listed in HPI.                                                                 Physical Exam:     /86   Pulse 61   Temp 36.5 °C (97.7 °F) (Temporal)   Wt 85.3 kg (188 lb)   BMI 27.76 kg/m²     Physical Exam  Vitals reviewed.   Constitutional:       Appearance: Normal appearance.   HENT:      Head: Normocephalic and atraumatic.      Nose: Nose normal.      Mouth/Throat:      Mouth: Mucous membranes are moist.      Pharynx: Oropharynx is clear.   Eyes:      General: No scleral icterus.     Pupils: Pupils are equal, round, and reactive to light.   Cardiovascular:      Rate and Rhythm: Normal rate and regular rhythm.      Pulses: Normal pulses.   Pulmonary:      Effort: Pulmonary effort is normal. No respiratory distress.   Abdominal:      General: Abdomen is  flat. There is no distension.      Palpations: Abdomen is soft. There is no mass.      Tenderness: There is no abdominal tenderness. There is no guarding or rebound.      Comments: No fluid wave.   Skin:     General: Skin is warm and dry.      Capillary Refill: Capillary refill takes less than 2 seconds.      Coloration: Skin is not jaundiced.   Neurological:      Mental Status: He is alert and oriented to person, place, and time.      Comments: No asterixis.                                                                          Labs:     Lab Results   Component Value Date    WBC 7.1 11/28/2024    WBC 11.8 (H) 11/27/2024    WBC 11.9 (H) 11/26/2024    HGB 14.0 11/28/2024    HGB 15.2 11/27/2024    HGB 15.8 11/26/2024    MCV 88 11/28/2024    MCV 87 11/27/2024    MCV 88 11/26/2024    PLT 96 (L) 11/28/2024     (L) 11/27/2024    PLT 93 (L) 11/26/2024       Lab Results   Component Value Date    GLUCOSE 90 11/28/2024    CALCIUM 8.8 11/28/2024     11/28/2024    K 3.8 11/28/2024    CO2 29 11/28/2024     11/28/2024    BUN 12 11/28/2024    CREATININE 0.86 11/28/2024       Lab Results   Component Value Date    ALT 41 11/28/2024    ALT 40 11/27/2024    ALT 43 11/26/2024    AST 46 (H) 11/28/2024    AST 48 (H) 11/27/2024    AST 43 (H) 11/26/2024    ALKPHOS 43 11/28/2024    ALKPHOS 52 11/27/2024    ALKPHOS 40 11/26/2024    BILITOT 0.8 11/28/2024    BILITOT 1.2 11/27/2024    BILITOT 1.0 11/26/2024                                                                                  Imaging           === 11/26/24 ===    US LIVER WITH DOPPLER    - Impression -  1. Hepatic steatosis. No focal hepatic lesion is evident.  2. Unremarkable Doppler interrogation.    === 11/24/24 ===    CT ABDOMEN PELVIS W AND WO IV CONTRAST    - Impression -  1.  Left bernie colitis sparing the rectum as detailed above.  Infectious colitis or possibly colitis due to ischemia could have  this appearance.  2. Enlarged prostate with probable  thickening of the bladder wall  though the bladder is not well distended.                                                                   GI Procedures:     Colonoscopy November 2024:  Impression  Erythematous, friable, ulcerated mucosa with erosion in the transverse colon, splenic flexure and descending colon, consistent with ischemic colitis; performed cold forceps biopsy to rule out colitis. On the differential IBD as well.  One 5 mm Maria L Is polyp in the sigmoid colon; performed cold snare removal.  Scattered diverticulosis of moderate severity in the sigmoid colon.  The rectum appeared normal. Performed random biopsy using biopsy forceps.  One Maria L Is polyp measuring smaller than 5 mm in the rectum; performed cold snare removal.  Hemorrhoids.  FINAL DIAGNOSIS   A. COLON, DESCENDING, BIOPSY:   --  COLONIC MUCOSA WITH FOCAL CHANGES OF ISCHEMIC TYPE INJURY.     Part A discussed at the GI consensus conference on 12/16/2024.     B. COLON, SIGMOID, POLYP, POLYPECTOMY:   --  FRAGMENTS OF TUBULAR ADENOMA.     C. COLON, RECTUM, BIOPSY:   -- COLONIC MUCOSA WITH NO SIGNIFICANT PATHOLOGIC FINDINGS.     D. COLON, RECTUM, POLYP, POLYPECTOMY:   --  TUBULAR ADENOMA.                                                                Assessment & Plan:     Assessment/Plan:   Alexandre Poole is an 64 y.o. male with PMH HTN and recent episode of ischemic colitis who presents to [facility]ology Clinic for initial consultation for cirrhotic morphology incidentally noted on imaging during recent hospitalization for ischemic colitis.     The patient's imaging findings along with thrombocytopenia are concerning for advanced fibrosis if not cirrhosis with clinically significant portal hypertension. He otherwise appears compensated at this time. Most likely etiology is chronic alcohol use. Now that the patient has been abstinent for several months, suspicion of cirrhosis can be non-invasively affirmed with VCTE.    MELD 3.0: 7 at 11/28/2024   6:10 AM  MELD-Na: 7 at 11/28/2024  6:10 AM  Calculated from:  Serum Creatinine: 0.86 mg/dL (Using min of 1 mg/dL) at 11/28/2024  6:10 AM  Serum Sodium: 141 mmol/L (Using max of 137 mmol/L) at 11/28/2024  6:10 AM  Total Bilirubin: 0.8 mg/dL (Using min of 1 mg/dL) at 11/28/2024  6:10 AM  Serum Albumin: 3.7 g/dL (Using max of 3.5 g/dL) at 11/28/2024  6:10 AM  INR(ratio): 1.1 at 11/26/2024  6:09 AM  Age at listing (hypothetical): 64 years  Sex: Male at 11/28/2024  6:10 AM    Child-Christensen: A5    #Compensated Alcohol Associated Cirrhosis, Suspected  #Thrombocytopenia  #Alcohol Use Disorder - Remission    Recommendations:  -Repeat CBC, INR, CMP, Peth today.  -Repeat ferritin today.  -Obtain FibroScan to evaluate for advanced fibrosis/cirrhosis  -If cirrhosis confirmed, will evaluate need for EGD for variceal screening vs empiric non-selective beta blockage based upon kPA and platelet count. Will additionally begin HCC surveillance (US, AFP) which would be next due in May 2025. Patient would additionally benefit from vaccinations for COVID19, influenza, bacterial pneumonia, HAV, and HBV per PCP.   -Continue alcohol abstinence.  -Avoid NSAIDs.  -Recommended DASH and/or Mediterranean diet along with at least 150 minutes of exercise weekly.  -Return to clinic in May 2025.    Detailed Counseling:     Assessed the patients understanding of their medications and discussed their treatment plan, assessed patient's response to medications and barriers to adherence.    Ricky Tyler MD   PGY4, Gastroenterology Fellow

## 2025-02-19 DIAGNOSIS — I10 ESSENTIAL (PRIMARY) HYPERTENSION: ICD-10-CM

## 2025-02-20 RX ORDER — LISINOPRIL AND HYDROCHLOROTHIAZIDE 20; 25 MG/1; MG/1
1 TABLET ORAL DAILY
Qty: 90 TABLET | Refills: 1 | Status: SHIPPED | OUTPATIENT
Start: 2025-02-20

## 2025-02-22 LAB
ALBUMIN SERPL-MCNC: 4.6 G/DL (ref 3.6–5.1)
ALP SERPL-CCNC: 46 U/L (ref 35–144)
ALT SERPL-CCNC: 27 U/L (ref 9–46)
ANION GAP SERPL CALCULATED.4IONS-SCNC: 10 MMOL/L (CALC) (ref 7–17)
AST SERPL-CCNC: 24 U/L (ref 10–35)
BILIRUB SERPL-MCNC: 0.8 MG/DL (ref 0.2–1.2)
BUN SERPL-MCNC: 16 MG/DL (ref 7–25)
CALCIUM SERPL-MCNC: 9.9 MG/DL (ref 8.6–10.3)
CHLORIDE SERPL-SCNC: 102 MMOL/L (ref 98–110)
CO2 SERPL-SCNC: 28 MMOL/L (ref 20–32)
CREAT SERPL-MCNC: 0.79 MG/DL (ref 0.7–1.35)
EGFRCR SERPLBLD CKD-EPI 2021: 100 ML/MIN/1.73M2
ERYTHROCYTE [DISTWIDTH] IN BLOOD BY AUTOMATED COUNT: 12.5 % (ref 11–15)
FERRITIN SERPL-MCNC: 366 NG/ML (ref 24–380)
GLUCOSE SERPL-MCNC: 94 MG/DL (ref 65–139)
HCT VFR BLD AUTO: 46.8 % (ref 38.5–50)
HGB BLD-MCNC: 15.7 G/DL (ref 13.2–17.1)
INR PPP: 1.1
MCH RBC QN AUTO: 29.5 PG (ref 27–33)
MCHC RBC AUTO-ENTMCNC: 33.5 G/DL (ref 32–36)
MCV RBC AUTO: 87.8 FL (ref 80–100)
PLATELET # BLD AUTO: 120 THOUSAND/UL (ref 140–400)
PLPETH BLD-MCNC: NORMAL NG/ML
PMV BLD REES-ECKER: 12.9 FL (ref 7.5–12.5)
POPETH BLD-MCNC: NORMAL NG/ML
POTASSIUM SERPL-SCNC: 3.9 MMOL/L (ref 3.5–5.3)
PROT SERPL-MCNC: 7.4 G/DL (ref 6.1–8.1)
PROTHROMBIN TIME: 11.4 SEC (ref 9–11.5)
QUEST NOTES AND COMMENTS: NORMAL
QUEST PATIENT HISTORICAL REPORT: NORMAL
RBC # BLD AUTO: 5.33 MILLION/UL (ref 4.2–5.8)
SERVICE CMNT-IMP: NORMAL
SODIUM SERPL-SCNC: 140 MMOL/L (ref 135–146)
WBC # BLD AUTO: 4.7 THOUSAND/UL (ref 3.8–10.8)

## 2025-02-24 LAB
ALBUMIN SERPL-MCNC: 4.6 G/DL (ref 3.6–5.1)
ALP SERPL-CCNC: 46 U/L (ref 35–144)
ALT SERPL-CCNC: 27 U/L (ref 9–46)
ANION GAP SERPL CALCULATED.4IONS-SCNC: 10 MMOL/L (CALC) (ref 7–17)
AST SERPL-CCNC: 24 U/L (ref 10–35)
BILIRUB SERPL-MCNC: 0.8 MG/DL (ref 0.2–1.2)
BUN SERPL-MCNC: 16 MG/DL (ref 7–25)
CALCIUM SERPL-MCNC: 9.9 MG/DL (ref 8.6–10.3)
CHLORIDE SERPL-SCNC: 102 MMOL/L (ref 98–110)
CO2 SERPL-SCNC: 28 MMOL/L (ref 20–32)
CREAT SERPL-MCNC: 0.79 MG/DL (ref 0.7–1.35)
EGFRCR SERPLBLD CKD-EPI 2021: 100 ML/MIN/1.73M2
ERYTHROCYTE [DISTWIDTH] IN BLOOD BY AUTOMATED COUNT: 12.5 % (ref 11–15)
FERRITIN SERPL-MCNC: 366 NG/ML (ref 24–380)
GLUCOSE SERPL-MCNC: 94 MG/DL (ref 65–139)
HCT VFR BLD AUTO: 46.8 % (ref 38.5–50)
HGB BLD-MCNC: 15.7 G/DL (ref 13.2–17.1)
INR PPP: 1.1
MCH RBC QN AUTO: 29.5 PG (ref 27–33)
MCHC RBC AUTO-ENTMCNC: 33.5 G/DL (ref 32–36)
MCV RBC AUTO: 87.8 FL (ref 80–100)
PLATELET # BLD AUTO: 120 THOUSAND/UL (ref 140–400)
PLPETH BLD-MCNC: NEGATIVE NG/ML
PMV BLD REES-ECKER: 12.9 FL (ref 7.5–12.5)
POPETH BLD-MCNC: NEGATIVE NG/ML
POTASSIUM SERPL-SCNC: 3.9 MMOL/L (ref 3.5–5.3)
PROT SERPL-MCNC: 7.4 G/DL (ref 6.1–8.1)
PROTHROMBIN TIME: 11.4 SEC (ref 9–11.5)
QUEST NOTES AND COMMENTS: NORMAL
RBC # BLD AUTO: 5.33 MILLION/UL (ref 4.2–5.8)
SERVICE CMNT-IMP: NORMAL
SODIUM SERPL-SCNC: 140 MMOL/L (ref 135–146)
WBC # BLD AUTO: 4.7 THOUSAND/UL (ref 3.8–10.8)

## 2025-03-08 NOTE — PROGRESS NOTES
Patient Name: Alexandre Poole  MRN: [id number]  Today's Date: 10/26/2023  Time Calculation  Start Time: 0750  Stop Time: 0835  Time Calculation (min): 45 min    Visit Number:  13     Current Problem  1. Back spasm            Precautions  Precautions  Precautions Comment: none    Pain  Pain Assessment: 0-10  Pain Score: 1  Pain Location: Back  Pain Descriptors:  (tiring feeling)    Subjective/General  Reason for Referral: LBP/SPASMS  The pt went on a third walk yesterday that was down hill on the way out and up hill on the way back.  He was tired after, but not painful.    Objective  Treatment:  Treatment times:  Therapeutic Exercise:  25 minutes  Manual Therapy:  15 minutes    Therapeutic Exercise:  Bike x 10 min,   slant board stretch 3x30 seconds,   H.S. stretch 3x30 seconds,   Supine hip flexor stretch over EOB 3x 30 seconds    Manual Therapy  STM in L sidelying to R LS area  Manual decompression with 2, #1 size cups for 5 minutes.  Pt educated      OP EDUCATION:Discussed continue self massage as able  and cont with HEP as issued previously    Assessment:   The pt is at the point that stretches feel good. He is returning to walking when he can and it is not increasing pain.  Trigger points reducing in size and responded well to manual    End of session pain ratin/10    Plan:     Continue with current POC with the following changes progress as tolerated    Assessment of current progress against goals:  Symptomatic relief with treatment and Regressing, increased or new symptoms  Goals:  Active       PT Problem       +  reduce pain to <3/10 for improved tolerance of motion and functional activities  (Progressing)       Start:  23    Expected End:  23            +  Increase lumbar ROM by 10 degrees in all planes of motion as needed for ADL's   (Progressing)       Start:  23    Expected End:  23            +  The pt will have 5/5 strength in  LE as needed for  (Met)       Start:  23    [facility] Discharge Summary    Briana Newman MRN# [id number]   Age: 34 year old YOB: 1991     Date of Admission:  3/6/2025  Date of Discharge::  3/8/2025  Admitting Provider:  ERICA Huynh CNM  Discharge Provider:  ERICA Cam CNM, LEAH, MS      Home clinic: [facility], [facility], [facility]          Admission Diagnoses:   Supervision of other high risk pregnancies, first trimester [O09.891]  Hx of preeclampsia, prior pregnancy, currently pregnant, first trimester [O09.291]  Hx of maternal third degree perineal laceration, currently pregnant [O09.299]  Fetal tachycardia affecting management of mother [O36.8390]          Discharge Diagnosis:     Normal spontaneous vaginal delivery          Procedures:     Procedure(s): Repair of first degree perineal laceration                Medications Prior to Admission:     Facility-Administered Medications Prior to Admission   Medication Dose Route Frequency Provider Last Rate Last Admin    [DISCONTINUED] hydrogen peroxide 3 % solution 1 mL  1 mL Topical See Admin Instructions          Medications Prior to Admission   Medication Sig Dispense Refill Last Dose/Taking    butalbital-acetaminophen-caffeine (ESGIC) -40 MG tablet Take 1 tablet by mouth once as directed for headaches. 8 tablet 0 Taking As Needed    polyethylene glycol (MIRALAX) 17 GM/Dose powder Take 17 g by mouth daily 510 g 0 Taking    SENNA-docusate sodium (SENNA S) 8.6-50 MG tablet Take 1 tablet by mouth 2 times daily as needed (constipation) 90 tablet 3 Taking As Needed    Vitamin D-Vitamin K (VITAMIN K2-VITAMIN D3 PO) Take by mouth.   Past Week    blood glucose (NO BRAND SPECIFIED) lancets standard Use to test blood sugar 4 times daily or as directed. 1 each 3     blood glucose (NO BRAND SPECIFIED) test strip Use to test blood sugar 4 times daily or as directed. 500 strip 3     blood glucose monitoring (SOFTCLIX) lancets use to test four times   Expected End:  12/21/23    Resolved:  10/16/23         + educate on core strength in order to support the lumbar spine in function  (Progressing)       Start:  09/22/23    Expected End:  12/21/23            +  the pt will be independent in self posture correction to place reduced stress on the lumbar spine  (Progressing)       Start:  09/22/23    Expected End:  12/21/23            + Reduce soft tissue restriction for reduced pain  (Progressing)       Start:  09/22/23    Expected End:  12/21/23            +   The pt will walk with  in the community with good balance and stability  (Progressing)       Start:  09/22/23    Expected End:  12/21/23            +  the pt will be able independent in a HEP for self managment  (Progressing)       Start:  09/22/23    Expected End:  12/21/23            +   Able to return to prior level of activities (Progressing)       Start:  09/22/23    Expected End:  12/21/23                daily       Blood Glucose Monitoring Suppl (ACCU-CHEK GUIDE ME) w/Device KIT USE AS DIRECTED TO TEST BLOOD GLUCOSE FOUR TIMES DAILY 1 kit 0     calcium carbonate (TUMS) 500 MG chewable tablet Take 1 chew tab by mouth 2 times daily       emollient (VANICREAM) external cream Apply topically 2 times daily. On entire body. Please provide patient also with Vanicream bar soap and Vanicream shampoo (Patient not taking: Reported on 3/5/2025) 453 g 4     Insulin Pen Needle (PEN NEEDLES) 32G X 4 MM MISC 1 each daily. 100 each 1     KETOSTIX test strip USE 1 STRIP EVERY MORNING (Patient not taking: Reported on 3/5/2025)       Prenatal Vit-Fe Fumarate-FA (PRENATAL 19) CHEW Take 1 chew tab by mouth daily.       Urine Glucose-Ketones Test (KETO-DIASTIX) STRP 1 strip every morning. (Patient not taking: Reported on 3/5/2025) 50 strip 0     [DISCONTINUED] aspirin 81 MG EC tablet Take 1 tablet (81 mg) by mouth daily for 270 days (Patient not taking: Reported on 3/5/2025) 90 tablet 2     [DISCONTINUED] clindamycin (CLEOCIN T) 1 % external lotion Apply topically 2 times daily. (Patient not taking: Reported on 3/5/2025) 60 mL 4     [DISCONTINUED] clobetasol (TEMOVATE) 0.05 % external cream Apply topically 2 times daily (Patient not taking: Reported on 3/5/2025) 60 g 3     [DISCONTINUED] clobetasol propionate (CLOBEX) 0.05 % external shampoo one time use Clobetasole shampoo and the other time Ketoconazole shampoo (Patient not taking: Reported on 3/5/2025) 118 mL 3     [DISCONTINUED] dupilumab (DUPIXENT) 300 MG/2ML prefilled pen Inject 2 mLs (300 mg) Subcutaneous every 14 days (Patient not taking: Reported on 3/5/2025) 4 mL 5     [DISCONTINUED] fluocinolone acetonide (DERMA SMOOTHE/FS BODY) 0.01 % external oil Apply to scalp 1-2 times per week as needed for scalp itch/flaking. (Patient not taking: Reported on 3/5/2025) 118.28 mL 0     [DISCONTINUED] fluticasone (FLONASE) 50 MCG/ACT nasal spray SHAKE LIQUID AND USE 2 SPRAYS IN EACH NOSTRIL  DAILY (Patient not taking: Reported on 3/5/2025) 16 g 0     [DISCONTINUED] hydrocortisone butyrate 0.1 % CREA Externally apply topically daily (Patient not taking: Reported on 3/5/2025)       [DISCONTINUED] insulin aspart (NOVOLOG PEN) 100 UNIT/ML pen Inject 4 Units subcutaneously 3 times daily (with meals). (Patient not taking: Reported on 3/5/2025) 15 mL 0     [DISCONTINUED] insulin NPH (HUMULIN N KWIKPEN) 100 UNIT/ML injection Inject 29 Units subcutaneously at bedtime. Dose increase for next refill.   3/4/2025    [DISCONTINUED] ketoconazole (NIZORAL) 2 % external shampoo Apply topically as needed for itching or irritation (one jonathan use Clobetasole shampoo and the other time Ketoconazole shampoo). (Patient not taking: Reported on 3/5/2025) 120 mL 3     [DISCONTINUED] ondansetron (ZOFRAN ODT) 4 MG ODT tab Take 1 tablet (4 mg) by mouth every 8 hours as needed for nausea (Patient not taking: Reported on 3/5/2025) 60 tablet 0     [DISCONTINUED] Polyethyl Glycol-Propyl Glycol (SYSTANE ULTRA OP) Apply 1 drop to eye as needed (for dry eyes). (Patient not taking: Reported on 3/5/2025)       [DISCONTINUED] triamcinolone (KENALOG) 0.1 % external cream Apply topically 2 times daily (Patient not taking: Reported on 3/5/2025) 45 g 0              Discharge Medications:     Current Discharge Medication List        START taking these medications    Details   acetaminophen (TYLENOL) 325 MG tablet Take 2 tablets (650 mg) by mouth every 6 hours as needed for mild pain. Start after Delivery.  Qty: 100 tablet, Refills: 0    Associated Diagnoses:  (normal spontaneous vaginal delivery)      ferrous sulfate (FEROSUL) 325 (65 Fe) MG tablet Take 1 tablet (325 mg) by mouth daily (with breakfast).  Qty: 60 tablet, Refills: 0    Associated Diagnoses:  (normal spontaneous vaginal delivery)      ibuprofen (ADVIL/MOTRIN) 600 MG tablet Take 1 tablet (600 mg) by mouth every 6 hours as needed for moderate pain. Start after delivery  Qty: 60  tablet, Refills: 0    Associated Diagnoses:  (normal spontaneous vaginal delivery)      !! senna-docusate (SENOKOT-S/PERICOLACE) 8.6-50 MG tablet Take 1 tablet by mouth daily. Start after delivery.  Qty: 100 tablet, Refills: 0    Associated Diagnoses:  (normal spontaneous vaginal delivery)       !! - Potential duplicate medications found. Please discuss with provider.        CONTINUE these medications which have NOT CHANGED    Details   butalbital-acetaminophen-caffeine (ESGIC) -40 MG tablet Take 1 tablet by mouth once as directed for headaches.  Qty: 8 tablet, Refills: 0    Comments: Limit to 4 times per month while pregnant  Associated Diagnoses: Cluster headache, not intractable, unspecified chronicity pattern      polyethylene glycol (MIRALAX) 17 GM/Dose powder Take 17 g by mouth daily  Qty: 510 g, Refills: 0    Associated Diagnoses: Other constipation      !! SENNA-docusate sodium (SENNA S) 8.6-50 MG tablet Take 1 tablet by mouth 2 times daily as needed (constipation)  Qty: 90 tablet, Refills: 3    Associated Diagnoses: Early stage of pregnancy      Vitamin D-Vitamin K (VITAMIN K2-VITAMIN D3 PO) Take by mouth.      blood glucose (NO BRAND SPECIFIED) lancets standard Use to test blood sugar 4 times daily or as directed.  Qty: 1 each, Refills: 3    Associated Diagnoses: Supervision of other high risk pregnancies, first trimester; Gestational diabetes mellitus (GDM) in third trimester, gestational diabetes method of control unspecified      blood glucose (NO BRAND SPECIFIED) test strip Use to test blood sugar 4 times daily or as directed.  Qty: 500 strip, Refills: 3    Associated Diagnoses: Supervision of other high risk pregnancies, first trimester; Gestational diabetes mellitus (GDM) in third trimester, gestational diabetes method of control unspecified      blood glucose monitoring (SOFTCLIX) lancets use to test four times daily      Blood Glucose Monitoring Suppl (ACCU-CHEK GUIDE ME) w/Device KIT  USE AS DIRECTED TO TEST BLOOD GLUCOSE FOUR TIMES DAILY  Qty: 1 kit, Refills: 0    Associated Diagnoses: Supervision of other high risk pregnancies, first trimester; Gestational diabetes mellitus (GDM) in third trimester, gestational diabetes method of control unspecified      calcium carbonate (TUMS) 500 MG chewable tablet Take 1 chew tab by mouth 2 times daily      emollient (VANICREAM) external cream Apply topically 2 times daily. On entire body. Please provide patient also with Vanicream bar soap and Vanicream shampoo  Qty: 453 g, Refills: 4    Associated Diagnoses: Other atopic dermatitis      Insulin Pen Needle (PEN NEEDLES) 32G X 4 MM MISC 1 each daily.  Qty: 100 each, Refills: 1    Associated Diagnoses: GDM (gestational diabetes mellitus)      KETOSTIX test strip USE 1 STRIP EVERY MORNING      Prenatal Vit-Fe Fumarate-FA (PRENATAL 19) CHEW Take 1 chew tab by mouth daily.      Urine Glucose-Ketones Test (KETO-DIASTIX) STRP 1 strip every morning.  Qty: 50 strip, Refills: 0    Associated Diagnoses: GDM (gestational diabetes mellitus)       !! - Potential duplicate medications found. Please discuss with provider.        STOP taking these medications       aspirin 81 MG EC tablet Comments:   Reason for Stopping:         clindamycin (CLEOCIN T) 1 % external lotion Comments:   Reason for Stopping:         clobetasol (TEMOVATE) 0.05 % external cream Comments:   Reason for Stopping:         clobetasol propionate (CLOBEX) 0.05 % external shampoo Comments:   Reason for Stopping:         dupilumab (DUPIXENT) 300 MG/2ML prefilled pen Comments:   Reason for Stopping:         fluocinolone acetonide (DERMA SMOOTHE/FS BODY) 0.01 % external oil Comments:   Reason for Stopping:         fluticasone (FLONASE) 50 MCG/ACT nasal spray Comments:   Reason for Stopping:         hydrocortisone butyrate 0.1 % CREA Comments:   Reason for Stopping:         insulin aspart (NOVOLOG PEN) 100 UNIT/ML pen Comments:   Reason for Stopping:          insulin NPH (HUMULIN N KWIKPEN) 100 UNIT/ML injection Comments:   Reason for Stopping:         ketoconazole (NIZORAL) 2 % external shampoo Comments:   Reason for Stopping:         ondansetron (ZOFRAN ODT) 4 MG ODT tab Comments:   Reason for Stopping:         Polyethyl Glycol-Propyl Glycol (SYSTANE ULTRA OP) Comments:   Reason for Stopping:         triamcinolone (KENALOG) 0.1 % external cream Comments:   Reason for Stopping:                     Consultations:   No consultations were requested during this admission          Brief History of Labor:   DELIVERY NOTE:  Briana Newman is a 34 year old  at 37w5d who [facility] for GDMA2.  Pt received one dose of vaginal misoprostol before Pitocin was started.  Patient received epidural with adequate pain relief.  Progressed to 4/50/-2 and AROM performed with patient's consent at 0126.  Briana progressed to complete at 0604 and began pushing shortly after.  Pushed effectively with CNM coaching. FHR with variable decels with pushing effort, return to baseline and moderate variability throughout. Head delivered OA and restituted to ROT.  Loose nuchal cord x1, which was delivered through. Shoulders easily delivered under maternal effort.  Live female  delivered at 0628 under epidural anesthesia.  Spontaneous breath, vigorous cry, well flexed, HR>100. Infant directly to maternal abdomen, skin to skin. Delayed cord clamping for 2 minutes then clamped x2 and cut by FOB. 20 units of pitocin infusing IV after baby with pt's consent. Cord blood obtained for typing.  Intact placenta spontaneously delivered via Crook at 0639.  3 vessel cord. Fundus firm @ midline. Vagina, perineum, and rectum inspected, perineum found to have a 1st degree perineal laceration that was repaired with 3.0 vicryl suture in the usual fashion. Hemostasis noted. Mother and infant stable; continued skin to skin. Good family bonding observed.      LEAH Booker  assumed cares after birth of baby, attended pt during third stage of labor and repair of perineal laceration.     Delivery Note:   IUP at 37 weeks gestation delivered on 2025.     delivery of a viable Female infant.  Weight : pending  Apgars of 8 at 1 minute and 9 at 5 minutes.  Labor was induced. Vaginal Miso x1. Pitocin  Medications administered  in labor:  Pain Rx Epidural; Antibiotics No;  Perineum: Intact, Vaginal Laceration  Placenta-mechanism: spontaneous, intact,  with a 3 vessel cord. IV oxytocin was given After delivery of baby  Quantitative Blood Loss was 100cc.   Complications of labor and delivery: Category two FHT tracing and Nuchal cord  Anticipated Discharge Date: 3/8/25  Birth attendants: Dionisio Reeder CNM, Student Nurse Midwife Ariane Dyson, and ERIAC Lopez CNM, CNM     Assessment Day of Discharge    Pt stable, baby Sabrine is rooming in  Breast feeding status: well established. Pt has been able to latch independently. Sabrine was cluster feeding last night, but appears satiated currently, swaddled and sleeping.   Complications since 2 hours post delivery: None  Patient is tolerating acitivity well Voiding without difficulty, cramping is improved by Ibuprofen, lochia is decreasing and patient denies clots.  Perineal pain is is minimal.      Postpartum Exam   Breasts: soft, filling  Nipples: erect, no lesions, intact  Abdomen: soft, nontender, fundus firm, umb/-1, midline  Perineum: laceration is well approximated, healing well, approximated, no edema, erythema, bruising, hematoma or s/s of infection  Lochia: min rubra, no clots, no odor  Legs: nontender, trace edema    Patient Vitals for the past 24 hrs:   BP Temp Temp src Pulse Resp   25 0925 -- 98.1  F (36.7  C) -- 71 17   25 0640 99/60 -- -- 76 --   25 0300 91/69 97.7  F (36.5  C) Oral 77 17   25 0040 97/40 -- -- -- --   25 0030 97/52 -- -- -- --   25 0020 101/68  -- -- -- --   03/08/25 0000 93/54 -- -- -- --   03/07/25 2345 105/49 -- -- -- --   03/07/25 2245 (!) 114/92 97.9  F (36.6  C) Oral 77 16   03/07/25 1825 93/79 98  F (36.7  C) -- 76 16   03/07/25 1407 107/70 98.4  F (36.9  C) -- 60 17                Hospital Course:   The patient's hospital course was unremarkable.  On discharge, her pain was well controlled. Vaginal bleeding is similar to peak menstrual flow.  Voiding without difficulty.  Ambulating well and tolerating a normal diet.  No fever.  Breastfeeding well.  Infant is stable.  No bowel movement yet.*  She was discharged on post-partum day #1.    Post-partum hemoglobin:   Hemoglobin   Date Value Ref Range Status   03/08/2025 10.3 (L) 11.7 - 15.7 g/dL Final     Recent Labs   Lab 03/08/25  0827 03/07/25  2339 03/07/25  0520 03/07/25  0416 03/07/25  0313 03/07/25  0209   GLC 95 88 79 94 119* 90        ASSESSMENT/PLAN:  Patient Active Problem List    Insulin controlled gestational diabetes mellitus (GDM) in third trimester         Priority: Medium [2]         Date Noted: 02/12/2025            Staying in contact with DE regarding glucose log.            As of 2/26/25: On 25 units of insulin                        BPPs 2x/week            Monthly growth scan                        1/7 DE started pt on 14u of NPH at bedtime            1/13 14 > 16u            1/27 16 > 18u            2/17 18> 22u            2/24 22> 25u      Fetal tachycardia affecting management of mother         Priority: Medium [2]         Date Noted: 02/12/2025            Fetal tachycardia noted during BPP 2/12/25,            monitoring on unit Category 1      Palpitations         Priority: Medium [2]         Date Noted: 10/16/2024      Rubella non-immune status, antepartum         Priority: Medium [2]         Date Noted: 08/15/2024            Equivocal rubella       Supervision of other high risk pregnancies, first trimester         Priority: Medium [2]         Date Noted: 08/14/2024             [facility]            choice: CNM group            Partner's name: Christianne Ca            Employment: part time MA            [x]NOB folder            [x]Dating            [x] 1st trimester screening: Patient declines 1st            tri genetic screening            [x]Fetal anatomy US ordered            [x] recommended LD ASA after 12 wks for PRE-E risk            [x] GDM risk, recommended early GCT and hgb A1C            [x]No need for utox in labor            [x]COVID vaccine completed            [x]Pap UTD- due                         12-23wks________________________            []Offer AFP after 15 wks            []Rubella equivocal            [x]Hep B immune             [x]Varicella immune            []FLU shot                        24-28wk_________________________            [x]EOB folder            [x]Labor plans: Un-Medicated preference            [x]Prenatal Ed            []: Declines            [x]Infant feeding plan: . Has had issues            with latching and supply in the past.[x]            care provider choice: Galileo Leal with [facility]            [x]PP Contraception plan: NFP            []TDAP after 27 weeks- declines, may get at work            []Rhogam if needed, date: N/A                        29-35 wk________________________                        [x] Water birth interest- form given            [x]GCT - failed 3hr, GDM            []RSV                        36-37 wks______________________             [x] GBS/CBC GBS neg            []OTC PP meds sent            []PP recovery plans/support:            []Planning CS-ERAS pkt                        38-42 wks______________________            []IOL reason/plans            []Postdates BPP       History of insulin controlled gestational diabetes mellitus (GDM)         Priority: Medium [2]         Date Noted: 2024            A1C today 24 and early 1hr                         - did not complete early 1 hour            Plan 1 hour at EOB                        12/18/2024: 1 hour      Hx of preeclampsia, prior pregnancy, currently pregnant, first trimester         Priority: Medium [2]         Date Noted: 08/14/2024            Baseline PreE labs 8/14/24, to start ASA @12wks                        11/20: reiterated taking aspirin daily                        12/18/2024: repeat labs today per pt preference                        2/26/25: repeat labs r/t constant headache and            visual change. Patient             normotensive      Hx of maternal third degree perineal laceration, currently pregnant         Priority: Medium [2]         Date Noted: 08/14/2024      Migraine with aura and without status migrainosus, not intractable         Priority: Medium [2]         Date Noted: 05/15/2024      Cervical high risk HPV (human papillomavirus) test positive         Priority: Medium [2]         Date Noted: 04/24/2024 7/7/20 NIL pap             4/24/24 NIL pap, + HR HPV (not 16 or 18). Plan:            cotest in 1 yr.             4/30/24 Pt notified       Current moderate episode of major depressive disorder without prior episode (H)         Priority: Medium [2]         Date Noted: 02/01/2024      Hx of CHRIS (generalized anxiety disorder)         Priority: Medium [2]         Date Noted: 02/01/2024      Postural tachycardia with syncope         Priority: Medium [2]         Date Noted: 08/29/2022            In pregnancy.-Has had postural tachycardia with            syncope, went to ED twice             for concerns of lightheadedness             Heart rate has gone up to 150 during these            episodesWore a Holter monitor for three days. She            will ship the monitor today. Has an echo scheduled            for early September and visit with cardiology in            October 2022 9/26/22 Briana has continued to feel a racing             heart all day long. She has had a normal            echocardiogram and Holter monitor workup. Has appt            with cardiology early October. Denies any syncope            now that she is out of the             first trimester            10/5/22 met with Cardiology, they recommended            compression socks                        10/16/24: Cardiology recommendations:            POTS - consider beta blocker with pregnancy            - will check zio first            2. Palpitations - as above            3. Dyspnea - check stress echo      BMI 35.0-35.9,adult         Priority: Medium [2]         Date Noted: 07/29/2022            BPP weekly @37wks            Growth ultrasound 32 weeks                        12/18/2024: order placed for scheduling at 32            weeks      Vitamin D deficiency         Priority: Medium [2]         Date Noted: 07/15/2022            18 at intake. Rx sent for recommended            supplementation of 4000IU daily.             11/16/22- Vit D 26- review at next visit___                Stable Post-partum day #1  Complications:   - Anemic. Plan for iron supplementation  - GDM2. Reviewed this morning's blood sugar was 95. Pt ate at 0300, so this was not a fasting. Discussed plan for 2 hours GTT at 6-8 weeks postpartum.   - Rubella not immune. Pt declines MMR today but states she will get it soon.     Plan d/c home today  RTC 2 weeks and 6-8 weeks  Teaching done: D/C Instructions: Nutrition/Activity, Engorgement Management, Birth Control Options, Warning Signs/When to Call: Excessive Bleeding, Infection, PP Depression, RTC Clinic for PP Appointment, PNV, and Iron supplemenation    Postpartum warning s/s reviewed, including bleeding/clots, fever, mastitis, or depression    Birthcontrol planned: Unsure, pt will consider. Has used NFP in the past and feels this works well. All of her pregnancies were planned.     Current Discharge Medication List        START taking these  medications    Details   acetaminophen (TYLENOL) 325 MG tablet Take 2 tablets (650 mg) by mouth every 6 hours as needed for mild pain. Start after Delivery.  Qty: 100 tablet, Refills: 0    Associated Diagnoses:  (normal spontaneous vaginal delivery)      ferrous sulfate (FEROSUL) 325 (65 Fe) MG tablet Take 1 tablet (325 mg) by mouth daily (with breakfast).  Qty: 60 tablet, Refills: 0    Associated Diagnoses:  (normal spontaneous vaginal delivery)      ibuprofen (ADVIL/MOTRIN) 600 MG tablet Take 1 tablet (600 mg) by mouth every 6 hours as needed for moderate pain. Start after delivery  Qty: 60 tablet, Refills: 0    Associated Diagnoses:  (normal spontaneous vaginal delivery)      !! senna-docusate (SENOKOT-S/PERICOLACE) 8.6-50 MG tablet Take 1 tablet by mouth daily. Start after delivery.  Qty: 100 tablet, Refills: 0    Associated Diagnoses:  (normal spontaneous vaginal delivery)       !! - Potential duplicate medications found. Please discuss with provider.        CONTINUE these medications which have NOT CHANGED    Details   butalbital-acetaminophen-caffeine (ESGIC) -40 MG tablet Take 1 tablet by mouth once as directed for headaches.  Qty: 8 tablet, Refills: 0    Comments: Limit to 4 times per month while pregnant  Associated Diagnoses: Cluster headache, not intractable, unspecified chronicity pattern      polyethylene glycol (MIRALAX) 17 GM/Dose powder Take 17 g by mouth daily  Qty: 510 g, Refills: 0    Associated Diagnoses: Other constipation      !! SENNA-docusate sodium (SENNA S) 8.6-50 MG tablet Take 1 tablet by mouth 2 times daily as needed (constipation)  Qty: 90 tablet, Refills: 3    Associated Diagnoses: Early stage of pregnancy      Vitamin D-Vitamin K (VITAMIN K2-VITAMIN D3 PO) Take by mouth.      blood glucose (NO BRAND SPECIFIED) lancets standard Use to test blood sugar 4 times daily or as directed.  Qty: 1 each, Refills: 3    Associated Diagnoses: Supervision of other high risk  pregnancies, first trimester; Gestational diabetes mellitus (GDM) in third trimester, gestational diabetes method of control unspecified      blood glucose (NO BRAND SPECIFIED) test strip Use to test blood sugar 4 times daily or as directed.  Qty: 500 strip, Refills: 3    Associated Diagnoses: Supervision of other high risk pregnancies, first trimester; Gestational diabetes mellitus (GDM) in third trimester, gestational diabetes method of control unspecified      blood glucose monitoring (SOFTCLIX) lancets use to test four times daily      Blood Glucose Monitoring Suppl (ACCU-CHEK GUIDE ME) w/Device KIT USE AS DIRECTED TO TEST BLOOD GLUCOSE FOUR TIMES DAILY  Qty: 1 kit, Refills: 0    Associated Diagnoses: Supervision of other high risk pregnancies, first trimester; Gestational diabetes mellitus (GDM) in third trimester, gestational diabetes method of control unspecified      calcium carbonate (TUMS) 500 MG chewable tablet Take 1 chew tab by mouth 2 times daily      emollient (VANICREAM) external cream Apply topically 2 times daily. On entire body. Please provide patient also with Vanicream bar soap and Vanicream shampoo  Qty: 453 g, Refills: 4    Associated Diagnoses: Other atopic dermatitis      Insulin Pen Needle (PEN NEEDLES) 32G X 4 MM MISC 1 each daily.  Qty: 100 each, Refills: 1    Associated Diagnoses: GDM (gestational diabetes mellitus)      KETOSTIX test strip USE 1 STRIP EVERY MORNING      Prenatal Vit-Fe Fumarate-FA (PRENATAL 19) CHEW Take 1 chew tab by mouth daily.      Urine Glucose-Ketones Test (KETO-DIASTIX) STRP 1 strip every morning.  Qty: 50 strip, Refills: 0    Associated Diagnoses: GDM (gestational diabetes mellitus)       !! - Potential duplicate medications found. Please discuss with provider.        STOP taking these medications       aspirin 81 MG EC tablet Comments:   Reason for Stopping:         clindamycin (CLEOCIN T) 1 % external lotion Comments:   Reason for Stopping:         clobetasol  (TEMOVATE) 0.05 % external cream Comments:   Reason for Stopping:         clobetasol propionate (CLOBEX) 0.05 % external shampoo Comments:   Reason for Stopping:         dupilumab (DUPIXENT) 300 MG/2ML prefilled pen Comments:   Reason for Stopping:         fluocinolone acetonide (DERMA SMOOTHE/FS BODY) 0.01 % external oil Comments:   Reason for Stopping:         fluticasone (FLONASE) 50 MCG/ACT nasal spray Comments:   Reason for Stopping:         hydrocortisone butyrate 0.1 % CREA Comments:   Reason for Stopping:         insulin aspart (NOVOLOG PEN) 100 UNIT/ML pen Comments:   Reason for Stopping:         insulin NPH (HUMULIN N KWIKPEN) 100 UNIT/ML injection Comments:   Reason for Stopping:         ketoconazole (NIZORAL) 2 % external shampoo Comments:   Reason for Stopping:         ondansetron (ZOFRAN ODT) 4 MG ODT tab Comments:   Reason for Stopping:         Polyethyl Glycol-Propyl Glycol (SYSTANE ULTRA OP) Comments:   Reason for Stopping:         triamcinolone (KENALOG) 0.1 % external cream Comments:   Reason for Stopping:                    Discharge Instructions and Follow-Up:     Discharge diet: Regular   Discharge activity: Pelvic rest: abstain from intercourse and do not use tampons for 6 week(s)   Discharge follow-up: In 2 weeks-either RN phone or in person visit if desires, then 6-8 wks SMOOTHM   Wound care: Drink plenty of fluids  Ice to area for comfort  Keep wound clean and dry  Warm sitz baths            Discharge Disposition:     Discharged to home   Follow up with [facility], [facility], [facility] for routine postpartum visit in 2 weeks (phone or in person) and 6 weeks    ERICA Cam CNM

## 2025-05-20 DIAGNOSIS — I10 ESSENTIAL (PRIMARY) HYPERTENSION: ICD-10-CM

## 2025-05-21 RX ORDER — AMLODIPINE BESYLATE 5 MG/1
5 TABLET ORAL DAILY
Qty: 90 TABLET | Refills: 1 | Status: SHIPPED | OUTPATIENT
Start: 2025-05-21

## 2025-08-16 DIAGNOSIS — I10 ESSENTIAL (PRIMARY) HYPERTENSION: ICD-10-CM

## 2025-08-19 RX ORDER — LISINOPRIL AND HYDROCHLOROTHIAZIDE 20; 25 MG/1; MG/1
1 TABLET ORAL DAILY
Qty: 90 TABLET | Refills: 1 | Status: SHIPPED | OUTPATIENT
Start: 2025-08-19